# Patient Record
Sex: MALE | Race: WHITE | Employment: OTHER | URBAN - METROPOLITAN AREA
[De-identification: names, ages, dates, MRNs, and addresses within clinical notes are randomized per-mention and may not be internally consistent; named-entity substitution may affect disease eponyms.]

---

## 2018-03-19 ENCOUNTER — OFFICE VISIT (OUTPATIENT)
Dept: CARDIOLOGY CLINIC | Age: 78
End: 2018-03-19
Payer: MEDICARE

## 2018-03-19 VITALS
DIASTOLIC BLOOD PRESSURE: 80 MMHG | HEART RATE: 62 BPM | HEIGHT: 70 IN | BODY MASS INDEX: 30.74 KG/M2 | RESPIRATION RATE: 16 BRPM | WEIGHT: 214.7 LBS | SYSTOLIC BLOOD PRESSURE: 120 MMHG

## 2018-03-19 DIAGNOSIS — I48.21 PERMANENT ATRIAL FIBRILLATION (HCC): ICD-10-CM

## 2018-03-19 DIAGNOSIS — I25.10 CORONARY ARTERY DISEASE INVOLVING NATIVE CORONARY ARTERY OF NATIVE HEART WITHOUT ANGINA PECTORIS: Primary | Chronic | ICD-10-CM

## 2018-03-19 PROCEDURE — 99213 OFFICE O/P EST LOW 20 MIN: CPT | Performed by: INTERNAL MEDICINE

## 2018-03-19 PROCEDURE — 93000 ELECTROCARDIOGRAM COMPLETE: CPT | Performed by: INTERNAL MEDICINE

## 2018-03-19 NOTE — PROGRESS NOTES
 7034. 13. Echo, 2008. Normal LV size and overall systolic function. LA dilation present. Normal valves. LA diameter 4.7 cm. 14. Exercise MPS, 2008. EF 56%. Abnormal septal motion consistent with postop state. Mild inferior hypokinesis. No ischemia or scarring. No TID. Low risk exam.  15. Family history negative for early coronary disease. His father  of cancer and his mother had esophageal cancer. 16. Abdominal aortic US, 2010. Atherosclerotic change with mild dilation of the distal abdominal aorta with maximum diameter 3 cm, unchanged from 2008, followed yearly by Neli Starr.    17. Lower extremity arterial duplex study revealed thrombosed aneurysm, left popliteal fossa with normal popliteal artery flow. Normal JACINDA and normal toe pressures consistent with no significant peripheral ischemia and good peripheral flow.    18. Exercise MPS, 2010. 10.2 METS, >85% MPHR. EF 71%. Septal wall motion abnormality consistent with CABG. Lateral reversible ischemia 11% LV mass. Moderate risk.    19. Intolerant of beta blockers due to lightheadedness, bradycardia and fatigue. Cardizem intolerant due to bradycardia  2011.  20. [facility] admission for AF/RVR, 2011. Echo showed LAE, but otherwise unremarkable. Exercise MPS to 10 METS, 85% MPHR showed normal perfusion with LVEF 52%. Pradaxa started.    21. [id number] estimate yearly stroke risk is 4%. 22. Two pack per day smoker for 20 years, abstinent since .    23. Successful DCCV at 100 watt seconds, [facility], 10/19/2011.    24. OP evaluation, 03/15/2012. AF, ventricular response 76 per minute. Amiodarone therapy initiated as an OP, initially 200 mg bid followed by 200 mg qd.  25. PUD, without symptoms, but with heme positive stools noted 10/2014. NSAID's including aspirin stopped. 26. GI work up  showed a small peptic ulcer and several small polyps, all of which were treated appropriately.    27. Recurrent atrial

## 2018-07-25 ENCOUNTER — TELEPHONE (OUTPATIENT)
Dept: CARDIOLOGY CLINIC | Age: 78
End: 2018-07-25

## 2018-07-26 NOTE — TELEPHONE ENCOUNTER
He should withhold the Xarelto the day before and the day of the procedure and should resume it the day after the procedure. In all, he will only skip 2 pills. Thanks!   DAB

## 2018-09-25 ENCOUNTER — OFFICE VISIT (OUTPATIENT)
Dept: CARDIOLOGY CLINIC | Age: 78
End: 2018-09-25
Payer: MEDICARE

## 2018-09-25 VITALS
HEIGHT: 70 IN | RESPIRATION RATE: 18 BRPM | DIASTOLIC BLOOD PRESSURE: 68 MMHG | HEART RATE: 82 BPM | SYSTOLIC BLOOD PRESSURE: 120 MMHG | BODY MASS INDEX: 29.63 KG/M2 | WEIGHT: 207 LBS

## 2018-09-25 DIAGNOSIS — I25.10 CORONARY ARTERY DISEASE INVOLVING NATIVE CORONARY ARTERY OF NATIVE HEART WITHOUT ANGINA PECTORIS: Primary | Chronic | ICD-10-CM

## 2018-09-25 DIAGNOSIS — I48.21 PERMANENT ATRIAL FIBRILLATION (HCC): ICD-10-CM

## 2018-09-25 PROCEDURE — 99213 OFFICE O/P EST LOW 20 MIN: CPT | Performed by: INTERNAL MEDICINE

## 2018-09-25 PROCEDURE — 93000 ELECTROCARDIOGRAM COMPLETE: CPT | Performed by: INTERNAL MEDICINE

## 2018-09-25 RX ORDER — ROSUVASTATIN CALCIUM 5 MG/1
TABLET, COATED ORAL
COMMUNITY
Start: 2018-07-10 | End: 2021-04-12 | Stop reason: SDUPTHER

## 2018-09-25 RX ORDER — LOSARTAN POTASSIUM 25 MG/1
12.5 TABLET ORAL PRN
COMMUNITY
Start: 2018-08-20 | End: 2021-01-19

## 2018-09-25 NOTE — PROGRESS NOTES
to atrial fibrillation in 2 weeks.    28. Second opinion by Electrophysiology at [facility], 12/20/2018. Recommended no further attempts at rhythm control and also recommended that amiodarone be stopped.       Review of Systems:  HEENT: negative for acute visual and auditory problems  Constitutional: negative for fever and chills  Respiratory: negative for cough and hemoptysis  Cardiovascular: negative for chest pain and dyspnea.  Patient has rare episodes of lightheadedness with change in posture.    Gastrointestinal: negative for abdominal pain, diarrhea, nausea and vomiting. Genitourinary: negative for dysuria and hematuria  Derm: negative for rash and skin lesion(s)  Neurological: negative for seizures and tremors  Endocrine: negative for diabetic symptoms including polydipsia and polyuria  Musculoskeletal: chronic peripheral edema, especially on the left  Psychiatric: negative for anxiety and major depression.     The remainder of the review of systems is negative except as noted above. On exam, he is a well-nourished white male who is awake, alert and oriented. P: 82 and irregularly irregular. BP: 120/68. Wt. 207 lbs. BMI: 29.7. HEENT is normocephalic and atraumatic. Extraocular muscles are intact. Sclerae are clear. Pupils are equal, round and react to light. The oral mucosa is moist.  Tongue is midline. His neck is supple. He has no jugular distention. Carotids are full. There are no bruits. He has no neck or supraclavicular masses and no thyromegaly. Respirations are unlabored. His chest is clear to auscultation and percussion. He has no presacral edema or chest wall tenderness. His heart has a regularly irregular rhythm without murmurs or gallops. PMI is not displaced. There is no precordial heave, lift or thrill. His abdomen is soft and normally active without masses, organomegaly or bruits. Extremities showed no edema.   Peripheral pulses are easily palpated in the feet bilaterally. I reviewed his electrocardiogram, which showed atrial fibrillation with a controlled ventricular response. He has an incomplete right bundle branch block. The tracing was unchanged from 03/19/2018. The patient has noted that his blood pressure has been fairly low and therefore has decreased his intake of Valsartan. He takes 1/2 tablet on occasion and often does not take it at all. He continues to check his blood pressure once or twice a day and has good measurements. Since he is not diabetic nor in failure, I believe that his approach to his blood pressure is reasonable. He states that since he has decreased his consumption of Valsartan, the lightheadedness has resolved. At the end of the visit, his medications were:   rosuvastatin (CRESTOR) 5 MG tablet    losartan (COZAAR) 25 MG tablet 25 mg    Cholecalciferol (VITAMIN D3) 2000 units CAPS Take 1 capsule by mouth daily   RESTASIS 0.05 % ophthalmic emulsion    aspirin 81 MG chewable tablet Take 81 mg by mouth daily   rivaroxaban (XARELTO) 20 MG TABS tablet Take 20 mg by mouth daily (with breakfast)    LUMIGAN 0.01 % SOLN Place 1 drop into both eyes daily. valsartan (DIOVAN) 160 MG tablet TAKE 1 TABLET DAILY (INSTRUCTED TO TAKE THE MORNING OF PROCEDURE)   pravastatin (PRAVACHOL) 40 MG tablet Take 40 mg by mouth daily. As noted, he is planning a river cruise of [facility] in the next few months. He should continue his current medical regimen and follow up with me in 6 months. I thank Dr. Antonino Colon for asking our advice regarding his care.       DAB/tlr

## 2018-10-10 ENCOUNTER — HOSPITAL ENCOUNTER (OUTPATIENT)
Dept: CARDIOLOGY | Age: 78
Discharge: HOME | End: 2018-10-10
Payer: MEDICARE

## 2018-10-10 VITALS
DIASTOLIC BLOOD PRESSURE: 60 MMHG | HEIGHT: 70 IN | SYSTOLIC BLOOD PRESSURE: 132 MMHG | WEIGHT: 202 LBS | HEART RATE: 86 BPM | BODY MASS INDEX: 28.92 KG/M2

## 2018-10-10 DIAGNOSIS — I25.10 CORONARY ARTERY DISEASE INVOLVING NATIVE CORONARY ARTERY OF NATIVE HEART WITHOUT ANGINA PECTORIS: Chronic | ICD-10-CM

## 2018-10-10 DIAGNOSIS — I48.21 PERMANENT ATRIAL FIBRILLATION (HCC): ICD-10-CM

## 2018-10-10 DIAGNOSIS — I25.10 CORONARY ARTERY DISEASE INVOLVING NATIVE CORONARY ARTERY OF NATIVE HEART WITHOUT ANGINA PECTORIS: Primary | Chronic | ICD-10-CM

## 2018-10-10 DIAGNOSIS — R06.02 SHORTNESS OF BREATH: Primary | ICD-10-CM

## 2018-10-10 PROCEDURE — 78452 HT MUSCLE IMAGE SPECT MULT: CPT

## 2018-10-10 PROCEDURE — 3430000000 HC RX DIAGNOSTIC RADIOPHARMACEUTICAL: Performed by: INTERNAL MEDICINE

## 2018-10-10 PROCEDURE — A9500 TC99M SESTAMIBI: HCPCS | Performed by: INTERNAL MEDICINE

## 2018-10-10 PROCEDURE — 93017 CV STRESS TEST TRACING ONLY: CPT

## 2018-10-10 PROCEDURE — 2580000003 HC RX 258: Performed by: INTERNAL MEDICINE

## 2018-10-10 RX ORDER — CELECOXIB 100 MG/1
100 CAPSULE ORAL PRN
COMMUNITY

## 2018-10-10 RX ORDER — SODIUM CHLORIDE 0.9 % (FLUSH) 0.9 %
10 SYRINGE (ML) INJECTION PRN
Status: DISCONTINUED | OUTPATIENT
Start: 2018-10-10 | End: 2018-10-11 | Stop reason: HOSPADM

## 2018-10-10 RX ADMIN — Medication 9.6 MILLICURIE: at 09:13

## 2018-10-10 RX ADMIN — Medication 30.2 MILLICURIE: at 10:51

## 2018-10-10 RX ADMIN — Medication 10 ML: at 09:13

## 2018-10-10 RX ADMIN — Medication 10 ML: at 10:51

## 2018-10-10 NOTE — PROCEDURES
Saline. Gated post-stress tomographic imaging was performed 20-25 minutes after stress. FINDINGS: The overall quality of the study was fair. Left ventricular cavity size was noted to be normal.    Rotational analog analysis demonstrated no patient motion or abnormal extracardiac radioactivity. The gated SPECT stress imaging in the short, vertical long, and horizontal long axis demonstrated normal homogeneous tracer distribution throughout the myocardium. The resting images show no change. Gated SPECT left ventricular ejection fraction was calculated to be 43%, with hypokinesis of the global wall. Impression:    1. Exercise EKG was negative. 2. The patient experienced no chest pain with exercise. 3. The myocardial perfusion imaging was normal with attenuation artifact   4. Overall left ventricular systolic function was abnormal without regional wall motion abnormalities. 5. Parham treadmill score was 7 implying low risk. 6. Exercise capacity was above average. 7. Intermediate risk general exercise treadmill test. Due to LV dysfunction. Thank you for sending your patient to this [employer].      Electronically signed by George Solano MD on 10/10/18 at 3:11 PM

## 2018-10-11 ENCOUNTER — TELEPHONE (OUTPATIENT)
Dept: CARDIOLOGY CLINIC | Age: 78
End: 2018-10-11

## 2018-10-22 ENCOUNTER — HOSPITAL ENCOUNTER (OUTPATIENT)
Dept: CARDIOLOGY | Age: 78
Discharge: HOME | End: 2018-10-22
Payer: MEDICARE

## 2018-10-22 DIAGNOSIS — I25.10 CORONARY ARTERY DISEASE INVOLVING NATIVE CORONARY ARTERY OF NATIVE HEART WITHOUT ANGINA PECTORIS: Chronic | ICD-10-CM

## 2018-10-22 DIAGNOSIS — I48.21 PERMANENT ATRIAL FIBRILLATION (HCC): ICD-10-CM

## 2018-10-22 LAB
LV EF: 47 %
LVEF MODALITY: NORMAL

## 2018-10-22 PROCEDURE — 93306 TTE W/DOPPLER COMPLETE: CPT

## 2018-10-23 ENCOUNTER — TELEPHONE (OUTPATIENT)
Dept: CARDIOLOGY CLINIC | Age: 78
End: 2018-10-23

## 2019-03-21 DIAGNOSIS — I73.9 PVD (PERIPHERAL VASCULAR DISEASE) (HCC): ICD-10-CM

## 2019-03-21 DIAGNOSIS — I72.4 POPLITEAL ANEURYSM (HCC): ICD-10-CM

## 2019-03-21 DIAGNOSIS — I71.40 ABDOMINAL AORTIC ANEURYSM (AAA) WITHOUT RUPTURE: Primary | ICD-10-CM

## 2019-03-28 ENCOUNTER — OFFICE VISIT (OUTPATIENT)
Dept: VASCULAR SURGERY | Age: 79
End: 2019-03-28
Payer: MEDICARE

## 2019-03-28 ENCOUNTER — HOSPITAL ENCOUNTER (OUTPATIENT)
Dept: CARDIOLOGY | Age: 79
Discharge: HOME | End: 2019-03-28
Payer: MEDICARE

## 2019-03-28 DIAGNOSIS — I72.4 POPLITEAL ARTERY ANEURYSM (HCC): ICD-10-CM

## 2019-03-28 DIAGNOSIS — I72.4 POPLITEAL ANEURYSM (HCC): ICD-10-CM

## 2019-03-28 DIAGNOSIS — I73.9 PVD (PERIPHERAL VASCULAR DISEASE) WITH CLAUDICATION (HCC): ICD-10-CM

## 2019-03-28 DIAGNOSIS — I71.40 ABDOMINAL AORTIC ANEURYSM (AAA) WITHOUT RUPTURE: Chronic | ICD-10-CM

## 2019-03-28 DIAGNOSIS — I73.9 PVD (PERIPHERAL VASCULAR DISEASE) (HCC): ICD-10-CM

## 2019-03-28 DIAGNOSIS — Z95.828 S/P FEMORAL-POPLITEAL BYPASS SURGERY: Primary | ICD-10-CM

## 2019-03-28 DIAGNOSIS — I71.40 ABDOMINAL AORTIC ANEURYSM (AAA) WITHOUT RUPTURE: ICD-10-CM

## 2019-03-28 PROCEDURE — 93926 LOWER EXTREMITY STUDY: CPT

## 2019-03-28 PROCEDURE — 99214 OFFICE O/P EST MOD 30 MIN: CPT | Performed by: SURGERY

## 2019-03-28 PROCEDURE — 93978 VASCULAR STUDY: CPT

## 2019-03-28 PROCEDURE — 93923 UPR/LXTR ART STDY 3+ LVLS: CPT

## 2019-04-10 ENCOUNTER — OFFICE VISIT (OUTPATIENT)
Dept: CARDIOLOGY CLINIC | Age: 79
End: 2019-04-10
Payer: MEDICARE

## 2019-04-10 VITALS
DIASTOLIC BLOOD PRESSURE: 82 MMHG | BODY MASS INDEX: 29.81 KG/M2 | RESPIRATION RATE: 16 BRPM | HEART RATE: 66 BPM | WEIGHT: 208.2 LBS | SYSTOLIC BLOOD PRESSURE: 138 MMHG | HEIGHT: 70 IN

## 2019-04-10 DIAGNOSIS — Z79.01 CHRONIC ANTICOAGULATION: ICD-10-CM

## 2019-04-10 DIAGNOSIS — I10 ESSENTIAL HYPERTENSION: ICD-10-CM

## 2019-04-10 DIAGNOSIS — I25.10 CORONARY ARTERY DISEASE INVOLVING NATIVE CORONARY ARTERY OF NATIVE HEART WITHOUT ANGINA PECTORIS: Primary | Chronic | ICD-10-CM

## 2019-04-10 DIAGNOSIS — I48.21 PERMANENT ATRIAL FIBRILLATION (HCC): ICD-10-CM

## 2019-04-10 DIAGNOSIS — Z95.1 HX OF CABG: ICD-10-CM

## 2019-04-10 PROCEDURE — 93000 ELECTROCARDIOGRAM COMPLETE: CPT | Performed by: INTERNAL MEDICINE

## 2019-04-10 PROCEDURE — 99214 OFFICE O/P EST MOD 30 MIN: CPT | Performed by: INTERNAL MEDICINE

## 2019-04-10 NOTE — PROGRESS NOTES
OUTPATIENT CARDIOLOGY FOLLOW-UP    Name: Susan Reyna    Age: 77 y.o. Primary Care Physician: Hernan Houston MD    Date of Service: 4/10/2019    Chief Complaint: Permanent atrial fibrillation, CAD    Interim History:  Previously followed by Dr. Jan Menezes. No new cardiac complaints since last cardiology evaluation (\"feel good\"). He denies recent chest pain, worsening SOB, palpitations, syncope, PND, or orthopnea. Rate controlled atrial fibrillation on EKG. He walks at the mall on a regular basis (4-5 laps). He is friends with Lakeshia Frazier and Robert Orta.     Review of Systems:   Cardiac: As per HPI  General: No fever, chills  Pulmonary: As per HPI  HEENT: No visual disturbances, difficult swallowing  GI: No nausea, vomiting  Musculoskeletal: BREAUX x 4, no focal motor deficits  Skin: Intact, no rashes  Neuro/Psych: No headache or seizures    Past Medical History:  Past Medical History:   Diagnosis Date    Atrial fibrillation ([address]     Atrial fibrillation with RVR ([address] 9/23/2011    Basal cell cancer     resected from left leg    Dyslipidemia     Gastric ulcer approx 2015    Hypertension     Myocardial infarct ([address] 1981    PVD (peripheral vascular disease) with claudication ([address] 1/9/2014    S/P femoral-popliteal bypass surgery 1/9/2014    Shoulder problem     Tachycardia        Past Surgical History:  Past Surgical History:   Procedure Laterality Date    ARTERIAL ANEURYSM REPAIR      popliteal artery - Dr. Max Henriquez, 11/2002    CARDIOVERSION  10/19/2011    DCCV    CORONARY ANGIOPLASTY  7/26/1988, 9/19/1988    CORONARY ARTERY BYPASS GRAFT  11/26/2002    ECHO COMPL W DOP COLOR FLOW  9/24/2011         UPPER GASTROINTESTINAL ENDOSCOPY         Family History:  Family History   Problem Relation Age of Onset    Cancer Mother     Cancer Father        Social History:  Social History     Socioeconomic History    Marital status:      Spouse name: Not on file    Number of children: Not on file    Years of education: Not on file    Highest education level: Not on file   Occupational History    Not on file   Social Needs    Financial resource strain: Not on file    Food insecurity:     Worry: Not on file     Inability: Not on file    Transportation needs:     Medical: Not on file     Non-medical: Not on file   Tobacco Use    Smoking status: Former Smoker     Packs/day: 2.00     Years: 20.00     Pack years: 40.00     Types: Cigarettes     Last attempt to quit: 9/3/1980     Years since quittin.6    Smokeless tobacco: Never Used   Substance and Sexual Activity    Alcohol use: Yes     Alcohol/week: 0.0 oz     Comment: Wine a few glasses a week    Drug use: No    Sexual activity: Not on file   Lifestyle    Physical activity:     Days per week: Not on file     Minutes per session: Not on file    Stress: Not on file   Relationships    Social connections:     Talks on phone: Not on file     Gets together: Not on file     Attends Confucianism service: Not on file     Active member of club or organization: Not on file     Attends meetings of clubs or organizations: Not on file     Relationship status: Not on file    Intimate partner violence:     Fear of current or ex partner: Not on file     Emotionally abused: Not on file     Physically abused: Not on file     Forced sexual activity: Not on file   Other Topics Concern    Not on file   Social History Narrative    Not on file       Allergies:   Allergies   Allergen Reactions    Diltiazem Hcl      Bradycardia         Current Medications:  Current Outpatient Medications   Medication Sig Dispense Refill    rosuvastatin (CRESTOR) 5 MG tablet       losartan (COZAAR) 25 MG tablet 25 mg       Cholecalciferol (VITAMIN D3) 2000 units CAPS Take 1 capsule by mouth daily      RESTASIS 0.05 % ophthalmic emulsion       aspirin 81 MG chewable tablet Take 81 mg by mouth daily      rivaroxaban (XARELTO) 20 MG TABS tablet Take 20 mg by mouth daily (with breakfast)       LUMIGAN 0.01 % SOLN Place 1 drop into both eyes daily.  celecoxib (CELEBREX) 100 MG capsule Take 100 mg by mouth daily      pravastatin (PRAVACHOL) 40 MG tablet Take 40 mg by mouth daily        No current facility-administered medications for this visit. Physical Exam:  /82   Pulse 66   Resp 16   Ht 5' 10\" (1.778 m)   Wt 208 lb 3.2 oz (94.4 kg)   BMI 29.87 kg/m²   Wt Readings from Last 3 Encounters:   04/10/19 208 lb 3.2 oz (94.4 kg)   10/10/18 202 lb (91.6 kg)   09/25/18 207 lb (93.9 kg)     Appearance: Awake, alert and oriented x 3, no acute respiratory distress  Skin: Intact, no rash  Head: Normocephalic, atraumatic  Eyes: EOMI, no conjunctival erythema  ENMT: No pharyngeal erythema, MMM, no rhinorrhea  Neck: Supple, no carotid bruits  Lungs: Clear to auscultation bilaterally. No wheezes, rales, or rhonchi.   Cardiac: IRRR, no murmurs apparent  Abdomen: Soft, nontender, +bowel sounds  Extremities: Moves all extremities x 4, no lower extremity edema  Neurologic: No focal motor deficits apparent, normal mood and affect, alert and oriented x 3    Laboratory Tests:  Lab Results   Component Value Date    CREATININE 1.1 09/22/2015    BUN 24 (H) 09/22/2015     09/22/2015    K 5.2 (H) 09/22/2015     09/22/2015    CO2 24 09/22/2015     No results found for: MG  Lab Results   Component Value Date    WBC 5.3 09/22/2015    HGB 14.9 09/22/2015    HCT 45.7 09/22/2015    .4 (H) 09/22/2015     09/22/2015     Lab Results   Component Value Date    ALT 72 (H) 09/22/2015    AST 42 (H) 09/22/2015    ALKPHOS 54 09/22/2015    BILITOT 0.4 09/22/2015     Lab Results   Component Value Date    INR 1.5 10/11/2011    INR 1.3 09/24/2011    PROTIME 12.9 (H) 10/11/2011    PROTIME 12.8 (H) 09/24/2011     Lab Results   Component Value Date    TSH 3.880 09/22/2015     Lab Results   Component Value Date    LABA1C 5.9 09/22/2015     No results found for: EAG  Lab Results   Component Value Date    CHOL 157 09/22/2015    CHOL 118 09/24/2011     Lab Results   Component Value Date    TRIG 104 09/22/2015    TRIG 47 09/24/2011     Lab Results   Component Value Date    HDL 60 09/22/2015    HDL 54.0 09/24/2011     Lab Results   Component Value Date    LDLCALC 76 09/22/2015    LDLCALC 55 09/24/2011     Lab Results   Component Value Date    LABVLDL 21 09/22/2015     No results found for: CHOLHDLRATIO  No results for input(s): PROBNP in the last 72 hours. Cardiac Tests:  ECG: atrial fibrillation, rate 66, NSSTT changes    Echocardiogram: 10/22/18 (Dr. Kaylene Macdonald)   Left ventricular internal dimensions are normal.   Left ventricular wall thickness is moderately hypertrophied.   The left ventricle basal inferior wall appears akinetic.   Overall left ventricular systolic function is mildly impaired.   The ejection fraction is estimated to be 47% by the biplane method of disks.   Diastole could not be fully assessed.   The left atrium is severely enlarged as determined by the left atrial volume index.   The right atrium appears to be enlarged.   Normal mitral leaflet structure and coaptation with no more than mild, centrally directed, mitral insufficiency.   The aortic valve appears mildly sclerotic.   Mild aortic regurgitation is noted. Exercise nuclear stress test: 10/10/18  1. Exercise EKG was negative. 2. The patient experienced no chest pain with exercise. 3. The myocardial perfusion imaging was normal with attenuation artifact   4. Overall left ventricular systolic function was abnormal without regional wall motion abnormalities. 5. Parham treadmill score was 7 implying low risk.    6. Exercise capacity was above average.    7. Intermediate risk general exercise treadmill test. Due to LV dysfunction. 8. Gated SPECT left ventricular ejection fraction was calculated to be 43%, with hypokinesis of the global wall.     ASSESSMENT / PLAN:  1.  Acute inferior wall MI, 10/1981. Medical therapy.    2. Cath with CX angioplasty, 1988. 3. Cath with CX angioplasty, 1988.  4. Worsening chest pain prompting catheterization, 2002.    5. CABG, 2002, [facility], Dr. Praveen Samaniego. LIMA-LAD, free pedicle ISAIAH-diagonal, left radial graft-PDA and SVG-OM branch of CX.  6. 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 Azalee Canavan.    7. Abdominal aortic US, 2008. Abdominal aortic maximal diameter of 3 cm. No definite aneurysm seen.    8. Dyslipidemia. Total cholesterol 141, HDL 41, LDL 76 - .  9. Echo, 2008. Normal LV size and overall systolic function. LA dilation present. Normal valves. LA diameter 4.7 cm. 10. Exercise MPS, 2008. EF 56%. Abnormal septal motion consistent with postop state. Mild inferior hypokinesis. No ischemia or scarring. No TID. Low risk exam.  11. Family history negative for early coronary disease. His father  of cancer and his mother had esophageal cancer. 12. Abdominal aortic US, 2010. Atherosclerotic change with mild dilation of the distal abdominal aorta with maximum diameter 3 cm, unchanged from 2008, followed yearly by Elis Borjas.    13. Lower extremity arterial duplex study revealed thrombosed aneurysm, left popliteal fossa with normal popliteal artery flow. Normal JACINDA and normal toe pressures consistent with no significant peripheral ischemia and good peripheral flow.    14. Exercise MPS, 2010. 10.2 METS, >85% MPHR. EF 71%. Septal wall motion abnormality consistent with CABG. Lateral reversible ischemia 11% LV mass. Moderate risk.    15. Intolerant of beta blockers due to lightheadedness, bradycardia and fatigue. Cardizem intolerant due to bradycardia - 2011. 16. [facility] admission for AF/RVR, 2011. Echo showed LAE, but otherwise unremarkable. Exercise MPS to 10 METS, 85% MPHR showed normal perfusion with LVEF 52%. Pradaxa started.    17. Elevated [id number] score  18.  Two pack per day smoker for 20 years, abstinent since 1980.    19. Successful DCCV at 100 watt seconds, [facility] [address], 10/19/2011.    20. OP evaluation, 03/15/2012. AF, ventricular response 76 per minute. Amiodarone therapy initiated as an OP, initially 200 mg bid followed by 200 mg qd.  21. PUD, without symptoms, but with heme positive stools noted 10/2014. NSAID's including aspirin stopped. 22. GI work up 2015 showed a small peptic ulcer and several small polyps, all of which were treated appropriately. 23. Recurrent atrial fibrillation documented mid October, 2017.  Patient underwent elective cardioversion, 11/03/2017 (Dr. Selena Myles), but he reverted back to atrial fibrillation in 2 weeks.    24. Second opinion by Electrophysiology at [facility], 12/20/2018. Recommended no further attempts at rhythm control and also recommended that amiodarone be stopped.      - Prior cardiology records reviewed today  - Continue home BP monitoring (BP log reviewed today -- BP intermittently elevated on days he doesn't take ARB)  - Continue current medications (including ARB and xarelto)  - Results of 10/2018 stress test and echocardiogram reviewed with the patient today    The patient's current medication list, allergies, problem list and results of all previously ordered testing were reviewed at today's visit.     Selina Mckee MD  [facility]) Cardiology

## 2019-11-18 LAB
BASOPHILS ABSOLUTE: NORMAL
BASOPHILS RELATIVE PERCENT: NORMAL
CHOLESTEROL, TOTAL: NORMAL
CHOLESTEROL/HDL RATIO: NORMAL
CREATININE: NORMAL
EOSINOPHILS ABSOLUTE: NORMAL
EOSINOPHILS RELATIVE PERCENT: NORMAL
HCT VFR BLD CALC: NORMAL %
HDLC SERPL-MCNC: NORMAL MG/DL
HEMOGLOBIN: NORMAL
LDL CHOLESTEROL CALCULATED: NORMAL
LYMPHOCYTES ABSOLUTE: NORMAL
LYMPHOCYTES RELATIVE PERCENT: NORMAL
MCH RBC QN AUTO: NORMAL PG
MCHC RBC AUTO-ENTMCNC: NORMAL G/DL
MCV RBC AUTO: NORMAL FL
MONOCYTES ABSOLUTE: NORMAL
MONOCYTES RELATIVE PERCENT: NORMAL
NEUTROPHILS ABSOLUTE: NORMAL
NEUTROPHILS RELATIVE PERCENT: NORMAL
PLATELET # BLD: NORMAL 10*3/UL
PMV BLD AUTO: NORMAL FL
POTASSIUM (K+): NORMAL
PROSTATE SPECIFIC ANTIGEN: NORMAL
RBC # BLD: NORMAL 10*6/UL
TRIGL SERPL-MCNC: NORMAL MG/DL
VITAMIN D 25-HYDROXY: NORMAL
VITAMIN D2, 25 HYDROXY: NORMAL
VITAMIN D3,25 HYDROXY: NORMAL
VLDLC SERPL CALC-MCNC: NORMAL MG/DL
WBC # BLD: NORMAL 10*3/UL

## 2019-11-19 LAB
AVERAGE GLUCOSE: 123
HBA1C MFR BLD: 5.9 %

## 2020-03-09 ENCOUNTER — TELEPHONE (OUTPATIENT)
Dept: CARDIOLOGY CLINIC | Age: 80
End: 2020-03-09

## 2020-03-09 NOTE — TELEPHONE ENCOUNTER
Patient scheduled for colonoscopy with Dr. Nalini Mitchell. Recommended to hold Xarelto (2) days prior. Please advise.

## 2020-05-05 ENCOUNTER — HOSPITAL ENCOUNTER (OUTPATIENT)
Age: 80
Discharge: HOME | End: 2020-05-07

## 2020-05-05 PROCEDURE — 88305 TISSUE EXAM BY PATHOLOGIST: CPT

## 2020-06-10 ENCOUNTER — OFFICE VISIT (OUTPATIENT)
Dept: CARDIOLOGY CLINIC | Age: 80
End: 2020-06-10
Payer: MEDICARE

## 2020-06-10 VITALS
WEIGHT: 197.9 LBS | TEMPERATURE: 97.9 F | SYSTOLIC BLOOD PRESSURE: 130 MMHG | HEIGHT: 70 IN | DIASTOLIC BLOOD PRESSURE: 80 MMHG | BODY MASS INDEX: 28.33 KG/M2 | HEART RATE: 75 BPM | RESPIRATION RATE: 18 BRPM

## 2020-06-10 PROCEDURE — 93000 ELECTROCARDIOGRAM COMPLETE: CPT | Performed by: INTERNAL MEDICINE

## 2020-06-10 PROCEDURE — 99213 OFFICE O/P EST LOW 20 MIN: CPT | Performed by: INTERNAL MEDICINE

## 2020-06-10 NOTE — PROGRESS NOTES
(H) 10/11/2011    PROTIME 12.8 (H) 09/24/2011     Lab Results   Component Value Date    TSH 3.880 09/22/2015     Lab Results   Component Value Date    LABA1C 5.9 09/22/2015     No results found for: EAG  Lab Results   Component Value Date    CHOL 157 09/22/2015    CHOL 118 09/24/2011     Lab Results   Component Value Date    TRIG 104 09/22/2015    TRIG 47 09/24/2011     Lab Results   Component Value Date    HDL 60 09/22/2015    HDL 54.0 09/24/2011     Lab Results   Component Value Date    LDLCALC 76 09/22/2015    LDLCALC 55 09/24/2011     Lab Results   Component Value Date    LABVLDL 21 09/22/2015     No results found for: CHOLHDLRATIO  No results for input(s): PROBNP in the last 72 hours. Cardiac Tests:  ECG: atrial fibrillation, rate 75, NSSTT changes    Echocardiogram: 10/22/18 (Dr. Nicolas Ruth)   Left ventricular internal dimensions are normal.   Left ventricular wall thickness is moderately hypertrophied.   The left ventricle basal inferior wall appears akinetic.   Overall left ventricular systolic function is mildly impaired.   The ejection fraction is estimated to be 47% by the biplane method of disks.   Diastole could not be fully assessed.   The left atrium is severely enlarged as determined by the left atrial volume index.   The right atrium appears to be enlarged.   Normal mitral leaflet structure and coaptation with no more than mild, centrally directed, mitral insufficiency.   The aortic valve appears mildly sclerotic.   Mild aortic regurgitation is noted. Exercise nuclear stress test: 10/10/18  1. Exercise EKG was negative. 2. The patient experienced no chest pain with exercise. 3. The myocardial perfusion imaging was normal with attenuation artifact   4. Overall left ventricular systolic function was abnormal without regional wall motion abnormalities. 5. Parham treadmill score was 7 implying low risk.    6. Exercise capacity was above average.    7.  Intermediate risk general exercise treadmill test. Due to LV dysfunction. 8. Gated SPECT left ventricular ejection fraction was calculated to be 43%, with hypokinesis of the global wall. ASSESSMENT / PLAN:  1.  Acute inferior wall MI, 10/1981. Medical therapy.    2. Cath with CX angioplasty, 1988. 3. Cath with CX angioplasty, 1988.  4. Worsening chest pain prompting catheterization, 2002.    5. CABG, 2002, [facility], Dr. Dione Francisco. LIMA-LAD, free pedicle ISAIAH-diagonal, left radial graft-PDA and SVG-OM branch of CX.  6. 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 Jed Tilley.    7. Abdominal aortic US, 2008. Abdominal aortic maximal diameter of 3 cm. No definite aneurysm seen.    8. Dyslipidemia. Total cholesterol 141, HDL 41, LDL 76 - .  9. Echo, 2008. Normal LV size and overall systolic function. LA dilation present. Normal valves. LA diameter 4.7 cm. 10. Exercise MPS, 2008. EF 56%. Abnormal septal motion consistent with postop state. Mild inferior hypokinesis. No ischemia or scarring. No TID. Low risk exam.  11. Family history negative for early coronary disease. His father  of cancer and his mother had esophageal cancer. 12. Abdominal aortic US, 2010. Atherosclerotic change with mild dilation of the distal abdominal aorta with maximum diameter 3 cm, unchanged from 2008, followed yearly by Debora Davila.    13. Lower extremity arterial duplex study revealed thrombosed aneurysm, left popliteal fossa with normal popliteal artery flow. Normal JACINDA and normal toe pressures consistent with no significant peripheral ischemia and good peripheral flow.    14. Exercise MPS, 2010. 10.2 METS, >85% MPHR. EF 71%. Septal wall motion abnormality consistent with CABG. Lateral reversible ischemia 11% LV mass. Moderate risk.    15. Intolerant of beta blockers due to lightheadedness, bradycardia and fatigue. Cardizem intolerant due to bradycardia - 2011. 16.  [facility] admission for AF/RVR,

## 2020-06-23 VITALS
SYSTOLIC BLOOD PRESSURE: 108 MMHG | HEART RATE: 66 BPM | TEMPERATURE: 96.8 F | HEIGHT: 71 IN | RESPIRATION RATE: 17 BRPM | WEIGHT: 194 LBS | BODY MASS INDEX: 27.16 KG/M2 | DIASTOLIC BLOOD PRESSURE: 67 MMHG

## 2020-06-25 ENCOUNTER — HOSPITAL ENCOUNTER (OUTPATIENT)
Dept: CARDIOLOGY | Age: 80
Discharge: HOME | End: 2020-06-25
Payer: MEDICARE

## 2020-06-25 ENCOUNTER — OFFICE VISIT (OUTPATIENT)
Dept: VASCULAR SURGERY | Age: 80
End: 2020-06-25
Payer: MEDICARE

## 2020-06-25 VITALS — BODY MASS INDEX: 28.35 KG/M2 | RESPIRATION RATE: 16 BRPM | HEIGHT: 70 IN | WEIGHT: 198 LBS

## 2020-06-25 PROCEDURE — 93978 VASCULAR STUDY: CPT

## 2020-06-25 PROCEDURE — 93923 UPR/LXTR ART STDY 3+ LVLS: CPT

## 2020-06-25 PROCEDURE — 93926 LOWER EXTREMITY STUDY: CPT

## 2020-06-25 PROCEDURE — 99214 OFFICE O/P EST MOD 30 MIN: CPT | Performed by: SURGERY

## 2020-06-25 NOTE — PROGRESS NOTES
following. The ultrasound abdominal echo revealed stable aneurysm, 3.2 cm with slight dilatation right iliac artery, without any significant change from last year, patient is reassured and will instead call and come to the hospital any abdominal pain or back pain. The popliteal artery duplex scan revealed on the right side, dilatation artery, 1.8 cm, without any signal change from last year, patient again was instructed call immediately if any symptoms in 1 pain etc. of the right leg,    The lower extremity arterial Doppler study was done was personally reviewed by me, normal ankle minutes with triphasic ankle Doppler tracings with patent bypass graft. Patient is reassured              Patient was instructed to continue walking program and to call if any worsening of symptoms and to call if any focal lateralizing neurological symptoms like loss of speech, vision or loss of function of extremity. All the questions were answered. Indicated follow-up: Return in about 1 year (around 6/25/2021), or if symptoms worsen or fail to improve.

## 2020-06-29 NOTE — PROCEDURES
[address]                  [address] [facility],  [facility]                                VASCULAR REPORT    PATIENT NAME: Cheryl Martinez                         :        1940  MED REC NO:   [id number]                            ROOM:  ACCOUNT NO:   [de-identified]                           ADMIT DATE: 2020  PROVIDER:     Aquiles Saldaña MD    DATE OF PROCEDURE:  2020    LOWER EXTREMITY ARTERIAL DOPPLER STUDY    INDICATION:  Difficulty walking, post left femoropopliteal bypass  surgery. FINDINGS:  The lower extremity arterial Doppler study revealed that the  patient has a normal ankle-brachial index with triphasic ankle Doppler  tracings and good arterial flow to both feet based upon the pulse volume  recordings of the metatarsals.         Jennifer Caicedo MD    D: 2020 6:24:31       T: 2020 9:01:42     [email or web address]  Job#: [id number]     Doc#: [id number]

## 2020-06-29 NOTE — PROCEDURES
[address]                  [address] [facility],  [facility]                                VASCULAR REPORT    PATIENT NAME: Robson Correa                         :        1940  MED REC NO:   [id number]                            ROOM:  ACCOUNT NO:   [de-identified]                           ADMIT DATE: 2020  PROVIDER:     Maryan Ramos MD    DATE OF PROCEDURE:  2020    ULTRASOUND OF THE ABDOMINAL AORTA    INDICATION:  History of abdominal aortic aneurysm. FINDINGS:  Duplex scanning of the abdominal aorta revealed dilatation of  the aorta, 3.2 cm x 3.2 cm; with right iliac artery dilatation, 2.2 x  2.2 cm; and left iliac artery dilatation, 1.9 x 1.9 cm. IMPRESSION:  Abdominal aortic aneurysm with a maximum diameter of 3.2 cm  and small right iliac artery is about 2.3 cm, unchanged from last year.         Bonny Powers MD    D: 2020 6:23:21       T: 2020 8:55:30     [email or web address]  Job#: [id number]     Doc#: [id number]

## 2020-06-30 ENCOUNTER — HOSPITAL ENCOUNTER (OUTPATIENT)
Age: 80
Discharge: HOME | End: 2020-07-02
Payer: MEDICARE

## 2020-06-30 ENCOUNTER — OFFICE VISIT (OUTPATIENT)
Dept: PRIMARY CARE CLINIC | Age: 80
End: 2020-06-30
Payer: MEDICARE

## 2020-06-30 VITALS
RESPIRATION RATE: 18 BRPM | HEIGHT: 71 IN | OXYGEN SATURATION: 99 % | DIASTOLIC BLOOD PRESSURE: 78 MMHG | SYSTOLIC BLOOD PRESSURE: 120 MMHG | TEMPERATURE: 97.3 F | WEIGHT: 200 LBS | HEART RATE: 76 BPM | BODY MASS INDEX: 28 KG/M2

## 2020-06-30 PROBLEM — I10 ESSENTIAL HYPERTENSION: Status: ACTIVE | Noted: 2020-06-30

## 2020-06-30 LAB
ALBUMIN SERPL-MCNC: 4.4 G/DL (ref 3.5–5.2)
ALP BLD-CCNC: 60 U/L (ref 40–129)
ALT SERPL-CCNC: 32 U/L (ref 0–40)
ANION GAP SERPL CALCULATED.3IONS-SCNC: 12 MMOL/L (ref 7–16)
AST SERPL-CCNC: 25 U/L (ref 0–39)
BILIRUB SERPL-MCNC: 0.8 MG/DL (ref 0–1.2)
BUN BLDV-MCNC: 22 MG/DL (ref 8–23)
CALCIUM SERPL-MCNC: 9.5 MG/DL (ref 8.6–10.2)
CHLORIDE BLD-SCNC: 106 MMOL/L (ref 98–107)
CHOLESTEROL, TOTAL: 129 MG/DL (ref 0–199)
CO2: 23 MMOL/L (ref 22–29)
CREAT SERPL-MCNC: 0.9 MG/DL (ref 0.7–1.2)
GFR AFRICAN AMERICAN: >60
GFR NON-AFRICAN AMERICAN: >60 ML/MIN/1.73
GLUCOSE BLD-MCNC: 117 MG/DL (ref 74–99)
HDLC SERPL-MCNC: 47 MG/DL
LDL CHOLESTEROL CALCULATED: 68 MG/DL (ref 0–99)
POTASSIUM SERPL-SCNC: 4.1 MMOL/L (ref 3.5–5)
SODIUM BLD-SCNC: 141 MMOL/L (ref 132–146)
TOTAL PROTEIN: 7.2 G/DL (ref 6.4–8.3)
TRIGL SERPL-MCNC: 71 MG/DL (ref 0–149)
TSH SERPL DL<=0.05 MIU/L-ACNC: 2.6 UIU/ML (ref 0.27–4.2)
VLDLC SERPL CALC-MCNC: 14 MG/DL

## 2020-06-30 PROCEDURE — 36415 COLL VENOUS BLD VENIPUNCTURE: CPT

## 2020-06-30 PROCEDURE — 84403 ASSAY OF TOTAL TESTOSTERONE: CPT

## 2020-06-30 PROCEDURE — 84443 ASSAY THYROID STIM HORMONE: CPT

## 2020-06-30 PROCEDURE — 84270 ASSAY OF SEX HORMONE GLOBUL: CPT

## 2020-06-30 PROCEDURE — 80061 LIPID PANEL: CPT

## 2020-06-30 PROCEDURE — 80053 COMPREHEN METABOLIC PANEL: CPT

## 2020-06-30 PROCEDURE — 99214 OFFICE O/P EST MOD 30 MIN: CPT | Performed by: INTERNAL MEDICINE

## 2020-06-30 ASSESSMENT — PATIENT HEALTH QUESTIONNAIRE - PHQ9
2. FEELING DOWN, DEPRESSED OR HOPELESS: 0
1. LITTLE INTEREST OR PLEASURE IN DOING THINGS: 0
SUM OF ALL RESPONSES TO PHQ QUESTIONS 1-9: 0
SUM OF ALL RESPONSES TO PHQ9 QUESTIONS 1 & 2: 0
SUM OF ALL RESPONSES TO PHQ QUESTIONS 1-9: 0

## 2020-06-30 NOTE — PROGRESS NOTES
Linh Lopez  6/30/20     Chief Complaint   Patient presents with    Hypertension     6 month check up        Allergies   Allergen Reactions    Diltiazem Hcl      Bradycardia          Current Outpatient Medications   Medication Sig Dispense Refill    celecoxib (CELEBREX) 100 MG capsule Take 100 mg by mouth as needed       rosuvastatin (CRESTOR) 5 MG tablet       losartan (COZAAR) 25 MG tablet Take 12.5 mg by mouth as needed Indications: pt states he takes periodically pt took 50mg on 10/3/18       Cholecalciferol (VITAMIN D3) 2000 units CAPS Take 1 capsule by mouth daily      RESTASIS 0.05 % ophthalmic emulsion       aspirin 81 MG chewable tablet Take 81 mg by mouth daily      rivaroxaban (XARELTO) 20 MG TABS tablet Take 20 mg by mouth daily (with breakfast)       LUMIGAN 0.01 % SOLN Place 1 drop into both eyes daily. No current facility-administered medications for this visit. HPI: Patient comes in for routine follow-up visit. Currently feels well. He remains physically active and walks on a fairly regular basis. He denies any chest pain or shortness of breath. He follows up with his cardiologist for his history of coronary artery disease and permanent atrial fibrillation. He remains on long-term anticoagulation with Xarelto and has not had any bleeding issues. He remains on all of his usual meds and supplements the same as listed on his med list, which was reviewed with him. Review of Systems: as per HPI      Physical Exam:    Patient is a [de-identified] y.o. male. Patient appears to be in no distress. Breathing comfortably. Ambulates without assistance. HEENT: normal.  Neck supple, no adenopathy or bruits. Heart RR, no MGR. Lungs clear. Abd: normal  Ext.: no edema. Peripheral pulses: normal.  No neurologic deficits noted.     Lab Results   Component Value Date    WBC 5.3 09/22/2015    HGB 14.9 09/22/2015    HCT 45.7 09/22/2015     09/22/2015    CHOL 157 09/22/2015    TRIG 104

## 2020-07-02 LAB
SEX HORMONE BINDING GLOBULIN: 63 NMOL/L (ref 11–80)
TESTOSTERONE FREE-NONMALE: 49.6 PG/ML (ref 47–244)
TESTOSTERONE TOTAL: 383 NG/DL (ref 220–1000)

## 2020-09-19 ENCOUNTER — APPOINTMENT (OUTPATIENT)
Dept: GENERAL RADIOLOGY | Age: 80
End: 2020-09-19
Payer: MEDICARE

## 2020-09-19 ENCOUNTER — HOSPITAL ENCOUNTER (EMERGENCY)
Age: 80
Discharge: HOME | End: 2020-09-19
Attending: EMERGENCY MEDICINE
Payer: MEDICARE

## 2020-09-19 VITALS
BODY MASS INDEX: 28.63 KG/M2 | TEMPERATURE: 98.2 F | SYSTOLIC BLOOD PRESSURE: 146 MMHG | WEIGHT: 200 LBS | OXYGEN SATURATION: 97 % | HEART RATE: 86 BPM | DIASTOLIC BLOOD PRESSURE: 86 MMHG | HEIGHT: 70 IN | RESPIRATION RATE: 16 BRPM

## 2020-09-19 PROCEDURE — 99283 EMERGENCY DEPT VISIT LOW MDM: CPT

## 2020-09-19 PROCEDURE — 73030 X-RAY EXAM OF SHOULDER: CPT

## 2020-09-19 PROCEDURE — 6370000000 HC RX 637 (ALT 250 FOR IP): Performed by: NURSE PRACTITIONER

## 2020-09-19 RX ORDER — HYDROCODONE BITARTRATE AND ACETAMINOPHEN 5; 325 MG/1; MG/1
1 TABLET ORAL ONCE
Status: COMPLETED | OUTPATIENT
Start: 2020-09-19 | End: 2020-09-19

## 2020-09-19 RX ORDER — HYDROCODONE BITARTRATE AND ACETAMINOPHEN 5; 325 MG/1; MG/1
1 TABLET ORAL EVERY 8 HOURS PRN
Qty: 9 TABLET | Refills: 0 | Status: SHIPPED | OUTPATIENT
Start: 2020-09-19 | End: 2020-09-22

## 2020-09-19 RX ADMIN — HYDROCODONE BITARTRATE AND ACETAMINOPHEN 1 TABLET: 5; 325 TABLET ORAL at 12:05

## 2020-09-19 ASSESSMENT — PAIN SCALES - GENERAL
PAINLEVEL_OUTOF10: 7
PAINLEVEL_OUTOF10: 5
PAINLEVEL_OUTOF10: 8

## 2020-09-19 ASSESSMENT — PAIN DESCRIPTION - ORIENTATION
ORIENTATION: LEFT
ORIENTATION: LEFT

## 2020-09-19 ASSESSMENT — PAIN DESCRIPTION - PAIN TYPE
TYPE: ACUTE PAIN
TYPE: ACUTE PAIN

## 2020-09-19 ASSESSMENT — PAIN DESCRIPTION - PROGRESSION: CLINICAL_PROGRESSION: GRADUALLY IMPROVING

## 2020-09-19 ASSESSMENT — PAIN DESCRIPTION - LOCATION
LOCATION: SHOULDER
LOCATION: SHOULDER

## 2020-09-19 ASSESSMENT — PAIN DESCRIPTION - FREQUENCY: FREQUENCY: CONTINUOUS

## 2020-09-19 ASSESSMENT — PAIN DESCRIPTION - DESCRIPTORS: DESCRIPTORS: ACHING

## 2020-09-19 NOTE — ED PROVIDER NOTES
ED Attending  CC: No         Department of Emergency Medicine   ED  Provider Note  Admit Date/RoomTime: 9/19/2020 11:21 AM  ED Room: /34    Chief Complaint   Fall (mechanical fall on wednesday, did not hit head, no loc, on xarelto) and Shoulder Injury (left)    History of Present Illness   Source of history provided by:  patient. History/Exam Limitations: none. Frederic Izaguirre is a 79 y.o. old male who has a past medical history of:   Past Medical History:   Diagnosis Date    Atrial fibrillation ([address]     Atrial fibrillation with RVR ([address] 9/23/2011    Basal cell cancer     resected from left leg    Current use of long term anticoagulation     Dyslipidemia     Gastric ulcer approx 2015    Hyperlipidemia     Hypertension     Myocardial infarct ([facility] 1981    PVD (peripheral vascular disease) with claudication ([facility] 1/9/2014    S/P femoral-popliteal bypass surgery 1/9/2014    Shoulder problem     Tachycardia     Type 2 diabetes mellitus without complication ([facility] [facility]     presents to the emergency department by private vehicle and ambulatory, for a fall which occured 4 day(s) prior to arrival. He was reportedly working in his yard while at home prior to incident with complaints of left shoulder pain. The patients tetanus status is up to date. Since onset the symptoms have been moderate in degree. His pain is aggraveated by movement, use and palpation and relieved by rest.  He denies any head injury, loss of consciousness, neck pain, chest pain, abdominal pain, numbness or weakness. Patient was asked several times and every time states he denies hitting his head. He states he landed on his left shoulder. .  ROS    Pertinent positives and negatives are stated within HPI, all other systems reviewed and are negative.     Past Surgical History:   Procedure Laterality Date    ARTERIAL ANEURYSM REPAIR      popliteal artery - Dr. Melany Negrete, 11/2002    ice for their symptoms with moderate improvement. Patient continues to be non-toxic on re-evaluation. Findings were discussed with the patient and reasons to immediately return to the ED were articulated to them. They will follow-up with their PMD and orthopedics. Patient discharged home with sling and Norco for pain. Discussed potential side effects of starting this medication. Patient states verbal understanding. Instructed patient this can increase her risk for sedation and addiction. [place] was reviewed and no signs and symptoms of drug abuse noted. Instructed patient do not drive at discharge receiving medications emerged part. Patient states verbal understanding      Counseling: The emergency provider has spoken with the patient and discussed todays results, in addition to providing specific details for the plan of care and counseling regarding the diagnosis and prognosis. Questions are answered at this time and they are agreeable with the plan. Assessment      1. Shoulder injury, left, initial encounter      Plan   Discharge to home  Patient condition is stable    New Medications     Discharge Medication List as of 9/19/2020 12:38 PM      START taking these medications    Details   HYDROcodone-acetaminophen (NORCO) 5-325 MG per tablet Take 1 tablet by mouth every 8 hours as needed for Pain for up to 3 days. Intended supply: 3 days. Take lowest dose possible to manage pain, Disp-9 tablet,R-0Print           Electronically signed by CUBA Murphy CNP   DD: 9/19/20  **This report was transcribed using voice recognition software. Every effort was made to ensure accuracy; however, inadvertent computerized transcription errors may be present.   END OF ED PROVIDER NOTE         CUBA Murphy CNP  09/19/20 [id number]

## 2021-01-11 ENCOUNTER — TELEPHONE (OUTPATIENT)
Dept: PRIMARY CARE CLINIC | Age: 81
End: 2021-01-11

## 2021-01-11 DIAGNOSIS — Z12.5 SCREENING FOR PROSTATE CANCER: ICD-10-CM

## 2021-01-11 DIAGNOSIS — I10 ESSENTIAL HYPERTENSION: ICD-10-CM

## 2021-01-11 DIAGNOSIS — I25.10 CORONARY ARTERY DISEASE INVOLVING NATIVE CORONARY ARTERY OF NATIVE HEART WITHOUT ANGINA PECTORIS: Primary | Chronic | ICD-10-CM

## 2021-01-11 DIAGNOSIS — E78.2 MIXED HYPERLIPIDEMIA: Chronic | ICD-10-CM

## 2021-01-12 DIAGNOSIS — E78.2 MIXED HYPERLIPIDEMIA: Chronic | ICD-10-CM

## 2021-01-12 DIAGNOSIS — Z12.5 SCREENING FOR PROSTATE CANCER: ICD-10-CM

## 2021-01-12 DIAGNOSIS — I10 ESSENTIAL HYPERTENSION: ICD-10-CM

## 2021-01-12 LAB
ALBUMIN SERPL-MCNC: 4.3 G/DL (ref 3.5–5.2)
ALP BLD-CCNC: 61 U/L (ref 40–129)
ALT SERPL-CCNC: 33 U/L (ref 0–40)
ANION GAP SERPL CALCULATED.3IONS-SCNC: 11 MMOL/L (ref 7–16)
AST SERPL-CCNC: 24 U/L (ref 0–39)
BASOPHILS ABSOLUTE: 0.04 E9/L (ref 0–0.2)
BASOPHILS RELATIVE PERCENT: 0.7 % (ref 0–2)
BILIRUB SERPL-MCNC: 0.5 MG/DL (ref 0–1.2)
BUN BLDV-MCNC: 22 MG/DL (ref 8–23)
CALCIUM SERPL-MCNC: 9.2 MG/DL (ref 8.6–10.2)
CHLORIDE BLD-SCNC: 104 MMOL/L (ref 98–107)
CHOLESTEROL, TOTAL: 141 MG/DL (ref 0–199)
CO2: 25 MMOL/L (ref 22–29)
CREAT SERPL-MCNC: 1 MG/DL (ref 0.7–1.2)
EOSINOPHILS ABSOLUTE: 0.21 E9/L (ref 0.05–0.5)
EOSINOPHILS RELATIVE PERCENT: 3.8 % (ref 0–6)
GFR AFRICAN AMERICAN: >60
GFR NON-AFRICAN AMERICAN: >60 ML/MIN/1.73
GLUCOSE BLD-MCNC: 88 MG/DL (ref 74–99)
HCT VFR BLD CALC: 46.8 % (ref 37–54)
HDLC SERPL-MCNC: 56 MG/DL
HEMOGLOBIN: 15 G/DL (ref 12.5–16.5)
IMMATURE GRANULOCYTES #: 0.01 E9/L
IMMATURE GRANULOCYTES %: 0.2 % (ref 0–5)
LDL CHOLESTEROL CALCULATED: 70 MG/DL (ref 0–99)
LYMPHOCYTES ABSOLUTE: 1.73 E9/L (ref 1.5–4)
LYMPHOCYTES RELATIVE PERCENT: 30.9 % (ref 20–42)
MCH RBC QN AUTO: 32.1 PG (ref 26–35)
MCHC RBC AUTO-ENTMCNC: 32.1 % (ref 32–34.5)
MCV RBC AUTO: 100 FL (ref 80–99.9)
MONOCYTES ABSOLUTE: 0.44 E9/L (ref 0.1–0.95)
MONOCYTES RELATIVE PERCENT: 7.9 % (ref 2–12)
NEUTROPHILS ABSOLUTE: 3.17 E9/L (ref 1.8–7.3)
NEUTROPHILS RELATIVE PERCENT: 56.5 % (ref 43–80)
PDW BLD-RTO: 13.2 FL (ref 11.5–15)
PLATELET # BLD: 188 E9/L (ref 130–450)
PMV BLD AUTO: 11 FL (ref 7–12)
POTASSIUM SERPL-SCNC: 4.5 MMOL/L (ref 3.5–5)
PROSTATE SPECIFIC ANTIGEN: 1.01 NG/ML (ref 0–4)
RBC # BLD: 4.68 E12/L (ref 3.8–5.8)
SODIUM BLD-SCNC: 140 MMOL/L (ref 132–146)
TOTAL PROTEIN: 7.4 G/DL (ref 6.4–8.3)
TRIGL SERPL-MCNC: 75 MG/DL (ref 0–149)
VLDLC SERPL CALC-MCNC: 15 MG/DL
WBC # BLD: 5.6 E9/L (ref 4.5–11.5)

## 2021-01-19 ENCOUNTER — OFFICE VISIT (OUTPATIENT)
Dept: PRIMARY CARE CLINIC | Age: 81
End: 2021-01-19
Payer: MEDICARE

## 2021-01-19 VITALS
OXYGEN SATURATION: 98 % | WEIGHT: 207 LBS | HEART RATE: 73 BPM | TEMPERATURE: 97 F | BODY MASS INDEX: 29.63 KG/M2 | HEIGHT: 70 IN | DIASTOLIC BLOOD PRESSURE: 68 MMHG | SYSTOLIC BLOOD PRESSURE: 120 MMHG

## 2021-01-19 DIAGNOSIS — I70.209 ATHEROSCLEROTIC PERIPHERAL VASCULAR DISEASE (HCC): ICD-10-CM

## 2021-01-19 DIAGNOSIS — R73.03 PRE-DIABETES: ICD-10-CM

## 2021-01-19 DIAGNOSIS — I25.10 CORONARY ARTERY DISEASE INVOLVING NATIVE CORONARY ARTERY OF NATIVE HEART WITHOUT ANGINA PECTORIS: Chronic | ICD-10-CM

## 2021-01-19 DIAGNOSIS — I71.40 ABDOMINAL AORTIC ANEURYSM (AAA) WITHOUT RUPTURE: Chronic | ICD-10-CM

## 2021-01-19 DIAGNOSIS — R73.9 BLOOD GLUCOSE ELEVATED: ICD-10-CM

## 2021-01-19 DIAGNOSIS — Z12.11 SCREEN FOR COLON CANCER: ICD-10-CM

## 2021-01-19 DIAGNOSIS — I10 ESSENTIAL HYPERTENSION: ICD-10-CM

## 2021-01-19 DIAGNOSIS — E78.2 MIXED HYPERLIPIDEMIA: Primary | Chronic | ICD-10-CM

## 2021-01-19 DIAGNOSIS — I48.21 PERMANENT ATRIAL FIBRILLATION (HCC): ICD-10-CM

## 2021-01-19 LAB
BILIRUBIN, POC: NORMAL
BLOOD URINE, POC: NORMAL
CLARITY, POC: CLEAR
COLOR, POC: YELLOW
CONTROL: NORMAL
GLUCOSE URINE, POC: NORMAL
HBA1C MFR BLD: 6.1 %
HEMOCCULT STL QL: NORMAL
KETONES, POC: NORMAL
LEUKOCYTE EST, POC: NORMAL
NITRITE, POC: NORMAL
PH, POC: 5.5
PROTEIN, POC: NORMAL
SPECIFIC GRAVITY, POC: 1.03
UROBILINOGEN, POC: 0.2

## 2021-01-19 PROCEDURE — 99215 OFFICE O/P EST HI 40 MIN: CPT | Performed by: INTERNAL MEDICINE

## 2021-01-19 PROCEDURE — 82274 ASSAY TEST FOR BLOOD FECAL: CPT | Performed by: INTERNAL MEDICINE

## 2021-01-19 PROCEDURE — 83036 HEMOGLOBIN GLYCOSYLATED A1C: CPT | Performed by: INTERNAL MEDICINE

## 2021-01-19 PROCEDURE — 81002 URINALYSIS NONAUTO W/O SCOPE: CPT | Performed by: INTERNAL MEDICINE

## 2021-01-19 RX ORDER — LOSARTAN POTASSIUM 25 MG/1
25 TABLET ORAL DAILY
COMMUNITY
End: 2021-01-19

## 2021-01-19 ASSESSMENT — PATIENT HEALTH QUESTIONNAIRE - PHQ9
SUM OF ALL RESPONSES TO PHQ9 QUESTIONS 1 & 2: 0
1. LITTLE INTEREST OR PLEASURE IN DOING THINGS: 0

## 2021-01-19 NOTE — PROGRESS NOTES
Alexsander Sanchezs  1/19/21     Chief Complaint   Patient presents with    Hyperlipidemia     physical review bw        Allergies   Allergen Reactions    Diltiazem Hcl      Bradycardia          Current Outpatient Medications   Medication Sig Dispense Refill    losartan (COZAAR) 25 MG tablet Take 12.5 mg by mouth daily      celecoxib (CELEBREX) 100 MG capsule Take 100 mg by mouth as needed       rosuvastatin (CRESTOR) 5 MG tablet       Cholecalciferol (VITAMIN D3) 2000 units CAPS Take 1 capsule by mouth daily      RESTASIS 0.05 % ophthalmic emulsion       aspirin 81 MG chewable tablet Take 81 mg by mouth daily      rivaroxaban (XARELTO) 20 MG TABS tablet Take 20 mg by mouth daily (with breakfast)       LUMIGAN 0.01 % SOLN Place 1 drop into both eyes daily. No current facility-administered medications for this visit. HPI: Patient comes in for his annual physical.  He is now 79years of age. Currently feels well. He denies any chest pain or shortness of breath. He remains on all of his usual meds and supplements the same as listed on his med list, which was reviewed with him. He is physically active and exercises on a regular basis. He is currently following a low-cholesterol, low carbohydrate, heart healthy diet. He does complain of persistent left shoulder pain following a fall about 5 months ago. He is following up with his orthopedic surgeon for it and is going to be getting some soft wave acoustic therapy. He follows up with his vascular surgeon annually for his atherosclerotic peripheral vascular disease, abdominal aortic aneurysm, and history of popliteal aneurysm. He has been stable in that regard.     Review of Systems    General:   no weight change, no malaise, no fatigue, no change in appetite, no sleep disturbance, no fever/chills, no night sweats,  Skin:                no abnormal pigmentation, no rash, no scaling, no itching, no masses, no hair or nail changes  Eyes: no blurring, no diplopia, no eye pain, no glaucoma, no cataracts  ENT:                 no hearing loss, no tinnitus, no vertigo, no nosebleed, no nasal congestion, no rhinorrhea, no sore throat, no jaw pain, no hoarseness,  no bleeding    Neck:     no node tenderness , not rigid, no masses   Respiratory:           no cough, no sputum, no coughing blood, no pleuritic , no chest pain, no dyspnea,  no wheezing  Cardiovascular:         no angina, no chest pain  No syncope, no pedal edema , no orthopnea, no PND, no palpitations, no claudication  Gastrointestinal  no nausea, no vomiting, no heartburn, no diarrhea, no constipation, no bloating, no abdominal pain, no rectal pain, no bleeding no hemorrhoids, no hernia. Genitourinary:            no urinary urgency, no frequency, no dysuria, no nocturia, no hesitancy, no  incontinence, no bleeding, no stones  Musculoskeletal:        Left shoulder pain since a fall several months ago. Neurologic:                 no paralysis, no paresis, no  paresthesia, no seizures, no tremors, no headaches, no tumors , no stroke, no speech issues,  No incoordination, no head trauma, no memory loss/concentration  Hematologic:              no anemia, no abnormal bleeding / bruising, no fever, no chills, no night sweats, no wollen glands, no unexplained weight loss  Endocrine:        no heat or cold intolerance and no polyphagia, polydipsia,  or polyuria  Psych:   no depression no anxiety, no suicidal or homicidal thoughts, no irritability, no decreased energy, no trouble falling asleep, no early awakening , no trouble concentrating                     Physical Exam:  Patient is an 79 y.o. male     Constitutional:  General Appearance: Well-appearing. Level of Distress: NAD. Ambulation: ambulating normally    Psychiatric:  Insight: good judgment: Mental status: normal mood and affect. Orientation: oriented to time place and person.   Memory: recent memory normal and remote memory normal.     Head: normocephalic and atraumatic. Eyes:  Lids and Conjunctiva: Non-injected and no pallor. Pupils: PERRLA, Corneas: grossly intact. EOMI, Lens: clear. Sclerae: non-icteric. Vision: peripheral vision grossly intact and acuity grossly intact. ENMT:  Ears: no lesions on external ear. TMs clear and TM mobility normal.  Hearing: no hearing loss. Nose: No lesions on external nose, septal deviation sinus tenderness or nasal discharge and nares patent and nasal passages clear. Lips, Teeth and Gums:  no mouth or lip ulcers or bleeding gums and normal dentition. Oropharynx: no erythema or exudates and moist mucous membranes and tonsils not enlarged. Neck:  Neck supple, FROM, trachea midline, and no masses. Lymph nodes: no cervical LAD, supraclavicular LAD, axillary LAD, or inguinal LAD. Thyroid: non-tender and no enlargement. Lungs:  Respiratory effort, no dyspnea. Auscultation: no rales/crackles or rhonchi and breath sounds normal, good air movement and CTA except as noted. Cardiovascular: Apical impulse:not displaced. Heart: Auscultation: normal S1 and S2, no murmurs, rubs or gallops; and RRR. Neck vessels: no carotid bruits. Pulses including femoral/pedal: normal throughout. Abdomen: Bowel sounds: normal.  Inspection and Palpation: no tenderness, guarding, masses, rebound tenderness or CVA tenderness and soft and non-distended. Liver: non-tender and no hepatomegaly. Spleen: non-tender and no splenomegaly. Hernia: none palpable. Musculoskeletal: Motor Strength and Tone: normal tone and motor strength. Joints, Bones and Muscles: no malalignment, tenderness or bony abnormalities and normal movement of all extremities. Left shoulder somewhat painful on range of motion. Extremities: no cyanosis, edema, varicosities, or palpable cord. Neurologic:  Gait and Station: normal gait and station. Cranial nerves:grossly intact.  Sensation:grossly intact and monofilament test intact. Reflexes: DTRs 2+ bilaterally throughout. Coordination and Cerebellum: finger to nose intact and no tremor. Skin: Inspection and palpation: no rash, lesions, ulcer, induration, nodules, jaundice or abnormal nevi and good turgor. Nails: normal.     Back:  Thoracolumbar Appearance: normal curvature. I have examined the patient's feet and found no evidence of deformities, calluses, ulcerations, or evidence of neuropathy. Lab Results   Component Value Date    WBC 5.6 01/12/2021    HGB 15.0 01/12/2021    HCT 46.8 01/12/2021     01/12/2021    CHOL 141 01/12/2021    TRIG 75 01/12/2021    HDL 56 01/12/2021    ALT 33 01/12/2021    AST 24 01/12/2021    TSH 2.600 06/30/2020    PSA 1.01 01/12/2021    INR 1.5 10/11/2011    LABA1C 6.1 01/19/2021      Lab Results   Component Value Date     01/12/2021    K 4.5 01/12/2021     01/12/2021    CO2 25 01/12/2021    BUN 22 01/12/2021    CREATININE 1.0 01/12/2021    GLUCOSE 88 01/12/2021    CALCIUM 9.2 01/12/2021    PROT 7.4 01/12/2021    LABALBU 4.3 01/12/2021    BILITOT 0.5 01/12/2021    ALKPHOS 61 01/12/2021    AST 24 01/12/2021    ALT 33 01/12/2021    LABGLOM >60 01/12/2021    GFRAA >60 01/12/2021            Assessment:    Baljinder Garcia was seen today for hyperlipidemia. Diagnoses and all orders for this visit:    Mixed hyperlipidemia, well controlled on rosuvastatin 5 mg daily. Pre-diabetes, hemoglobin A1c 6.1% today, which technically puts him in the diabetic range. -     POCT Urinalysis no Micro  -     POCT glycosylated hemoglobin (Hb A1C)    Screen for colon cancer  -     POCT Fit Test; Future  -     POCT Fit Test    Coronary artery disease involving native coronary artery of native heart without angina pectoris, stable and followed by his cardiologist.    Abdominal aortic aneurysm (AAA) without rupture ([address] stable and followed by vascular surgery.     Permanent atrial fibrillation ([address] stable and rate controlled and followed by his cardiologist.    Essential hypertension, controlled on current meds. Atherosclerotic peripheral vascular disease ([address] stable and followed by vascular surgery. Discussion Notes: Patient will continue all of his current meds and supplements the same as listed on his med list.  He is encouraged to continue a low-cholesterol, low refined carbohydrate, heart healthy diet and regular exercise. He will follow-up with his cardiologist and vascular surgeon as per their instructions. Return here as needed or in 6 months for follow-up visit and labs including a CMP, hemoglobin A1c, and lipid panel. We will recheck his urine analysis in 1 month.

## 2021-01-20 RX ORDER — LOSARTAN POTASSIUM 25 MG/1
TABLET ORAL
Qty: 45 TABLET | Refills: 3 | Status: SHIPPED
Start: 2021-01-20 | End: 2021-08-16 | Stop reason: HOSPADM

## 2021-04-12 RX ORDER — ROSUVASTATIN CALCIUM 5 MG/1
5 TABLET, COATED ORAL DAILY
Qty: 90 TABLET | Refills: 3 | Status: SHIPPED
Start: 2021-04-12 | End: 2022-04-07 | Stop reason: SDUPTHER

## 2021-06-17 DIAGNOSIS — I73.9 PVD (PERIPHERAL VASCULAR DISEASE) WITH CLAUDICATION (HCC): ICD-10-CM

## 2021-06-17 DIAGNOSIS — I72.4 POPLITEAL ARTERY ANEURYSM (HCC): ICD-10-CM

## 2021-06-17 DIAGNOSIS — Z95.828 S/P FEMORAL-POPLITEAL BYPASS SURGERY: ICD-10-CM

## 2021-06-17 DIAGNOSIS — I71.40 ABDOMINAL AORTIC ANEURYSM (AAA) WITHOUT RUPTURE: Primary | ICD-10-CM

## 2021-07-01 ENCOUNTER — HOSPITAL ENCOUNTER (OUTPATIENT)
Dept: CARDIOLOGY | Age: 81
Discharge: HOME | End: 2021-07-01
Payer: MEDICARE

## 2021-07-01 ENCOUNTER — OFFICE VISIT (OUTPATIENT)
Dept: VASCULAR SURGERY | Age: 81
End: 2021-07-01
Payer: MEDICARE

## 2021-07-01 VITALS — WEIGHT: 203 LBS | BODY MASS INDEX: 29.06 KG/M2 | HEIGHT: 70 IN

## 2021-07-01 DIAGNOSIS — Z95.828 S/P FEMORAL-POPLITEAL BYPASS SURGERY: ICD-10-CM

## 2021-07-01 DIAGNOSIS — I71.40 ABDOMINAL AORTIC ANEURYSM (AAA) WITHOUT RUPTURE: Primary | Chronic | ICD-10-CM

## 2021-07-01 DIAGNOSIS — I73.9 PVD (PERIPHERAL VASCULAR DISEASE) WITH CLAUDICATION (HCC): ICD-10-CM

## 2021-07-01 DIAGNOSIS — I72.4 POPLITEAL ARTERY ANEURYSM (HCC): ICD-10-CM

## 2021-07-01 DIAGNOSIS — I71.40 ABDOMINAL AORTIC ANEURYSM (AAA) WITHOUT RUPTURE: ICD-10-CM

## 2021-07-01 PROCEDURE — 99214 OFFICE O/P EST MOD 30 MIN: CPT | Performed by: SURGERY

## 2021-07-01 PROCEDURE — 93978 VASCULAR STUDY: CPT

## 2021-07-01 PROCEDURE — 93923 UPR/LXTR ART STDY 3+ LVLS: CPT

## 2021-07-01 PROCEDURE — 93926 LOWER EXTREMITY STUDY: CPT

## 2021-07-01 NOTE — PROGRESS NOTES
Chief Complaint:   Chief Complaint   Patient presents with    Circulatory Problem     AAA and pt. states he has left foot numbness         HPI: Patient came to the office, for the evaluation of multiple medical and vascular issues including abdominal aortic rhythm, right popliteal artery disease, status post left femoral-popliteal bypass surgery, overall doing well    Denies any chest pain or palpitation      Patient denies any focal lateralizing neurological symptoms like loss of speech, vision or loss of function of extremity    Patient can walk a few blocks , and denies any symptoms of rest pain    Allergies   Allergen Reactions    Diltiazem Hcl      Bradycardia         Current Outpatient Medications   Medication Sig Dispense Refill    rosuvastatin (CRESTOR) 5 MG tablet Take 1 tablet by mouth daily 90 tablet 3    rivaroxaban (XARELTO) 20 MG TABS tablet Take 1 tablet by mouth daily (with breakfast) Indications: pt takes with dinner 90 tablet 3    losartan (COZAAR) 25 MG tablet Take 1/2 tab daily 45 tablet 3    losartan (COZAAR) 25 MG tablet Take 12.5 mg by mouth daily      celecoxib (CELEBREX) 100 MG capsule Take 100 mg by mouth as needed       Cholecalciferol (VITAMIN D3) 2000 units CAPS Take 1 capsule by mouth daily      RESTASIS 0.05 % ophthalmic emulsion       aspirin 81 MG chewable tablet Take 81 mg by mouth daily      LUMIGAN 0.01 % SOLN Place 1 drop into both eyes daily. No current facility-administered medications for this visit.        Past Medical History:   Diagnosis Date    [facility]     Atrial fibrillation with RVR ([facility] [facility] 9/23/2011    Basal cell cancer     resected from left leg    Current use of long term anticoagulation     Dyslipidemia     Gastric ulcer approx 2015    Hyperlipidemia     Hypertension     Myocardial infarct ([address] 1981    PVD (peripheral vascular disease) with claudication ([facility] [facility] 1/9/2014    S/P femoral-popliteal bypass surgery 1/9/2014    Shoulder problem     Tachycardia     Type 2 diabetes mellitus without complication [facility])        Past Surgical History:   Procedure Laterality Date    ARTERIAL ANEURYSM REPAIR      popliteal artery - Dr. Brigida Loja, 2002    CARDIOVERSION  10/19/2011    DCCV    CORONARY ANGIOPLASTY  1988, 1988    CORONARY ARTERY BYPASS GRAFT  2002    ECHO COMPL W DOP COLOR FLOW  2011         UPPER GASTROINTESTINAL ENDOSCOPY         Family History   Problem Relation Age of Onset    Cancer Mother         Esophageal CA    Cancer Father         Lung CA       Social History     Socioeconomic History    Marital status:      Spouse name: Not on file    Number of children: Not on file    Years of education: Not on file    Highest education level: Not on file   Occupational History    Not on file   Tobacco Use    Smoking status: Former Smoker     Packs/day: 2.00     Years: 20.00     Pack years: 40.00     Types: Cigarettes     Quit date: 9/3/1980     Years since quittin.8    Smokeless tobacco: Never Used   Vaping Use    Vaping Use: Never used   Substance and Sexual Activity    Alcohol use: Yes     Alcohol/week: 0.0 standard drinks     Comment: Wine a few glasses a week and beer    Drug use: No    Sexual activity: Not on file   Other Topics Concern    Not on file   Social History Narrative    Not on file     Social Determinants of Health     Financial Resource Strain:     Difficulty of Paying Living Expenses:    Food Insecurity:     Worried About Running Out of Food in the Last Year:     [address] in the Last Year:    Transportation Needs:     Lack of Transportation (Medical):      Lack of Transportation (Non-Medical):    Physical Activity:     Days of Exercise per Week:     Minutes of Exercise per Session:    Stress:     Feeling of Stress :    Social Connections:     Frequency of Communication with Friends and Family:     Frequency ultrasound the abdomen revealed stable aneurysm, 3.2 cm, no change from last year, patient was instead call and come to hospital any abdominal pain or back pain    The ultrasound of the right popliteal artery revealed small aneurysm 1.8 cm, unchanged from last year, patient was instructed to call immediately if any pain in the right leg    The lower extremity arterial Doppler study revealed normal ankle medics with patent left femoral-popliteal bypass surgery    The patient is reassured              Patient was instructed to continue walking program and to call if any worsening of symptoms and to call if any focal lateralizing neurological symptoms like loss of speech, vision or loss of function of extremity. All the questions were answered. No orders of the defined types were placed in this encounter. No orders of the defined types were placed in this encounter. Indicated follow-up: Return if symptoms worsen or fail to improve.

## 2021-07-01 NOTE — PROGRESS NOTES
[facility] Heart & Vascular Lab - [facility]    This is a pre read worksheet - prior to official physician interpretation    Cas Valle  1940  Date of study: 7/1/21      Study : Right Popliteal Artery Duplex    RIGHT POPLITEAL ARTERY   1.8 x 1.7 cm            LAST STUDY  6/25/2020  1.8 cm

## 2021-07-01 NOTE — PROGRESS NOTES
[facility] Heart & Vascular Lab - [facility]    This is a pre read worksheet - prior to official physician interpretation    Diony Franco  1940  Date of study: 7/1/21    Indication for study:  AAA  Study :  Aortic Ultrasound    Diameter Aorta:     Proximal:   cm     Mid:   cm     Distal:  3.2 x 3.2 cm     Right iliac: 2.2 x 2.2 cm     Left iliac: 2.0 x 2.0 cm    Additional comments:         LAST STUDY  6/25/2020  3.2 cm  Rt 2.2  Lt 1.9

## 2021-07-05 NOTE — PROCEDURES
[address]                  [address] [facility],  [facility]                                VASCULAR REPORT    PATIENT NAME: Gayle De La O                         :        1940  MED REC NO:   [id number]                            ROOM:  ACCOUNT NO:   [de-identified]                           ADMIT DATE: 2021  PROVIDER:     Aryan Naik MD    DATE OF PROCEDURE:  2021    ULTRASOUND ABDOMINAL AORTA    INDICATION:  History of abdominal aortic aneurysm. Duplex scanning of the abdominal aorta revealed dilatation of aorta 3.2  x 3.2 cm with slightly dilated right iliac artery of 2.2 cm and left  iliac artery of 2 cm diameter. IMPRESSION:  Abdominal aortic aneurysm, maximum diameter of 3.2 cm,  unchanged from last year.         Artist MD Jonathan    D: 2021 19:25:31       T: 2021 19:27:27     [email or web address]  Job#: [id number]     Doc#: [id number]    CC:

## 2021-07-05 NOTE — PROCEDURES
[address]                  [address] [facility],  [facility]                                VASCULAR REPORT    PATIENT NAME: Leia Molina                         :        1940  MED REC NO:   [id number]                            ROOM:  ACCOUNT NO:   [de-identified]                           ADMIT DATE: 2021  PROVIDER:     Jodee Mancilla MD    DATE OF PROCEDURE:  2021    ULTRASOUND RIGHT POPLITEAL ARTERY    INDICATION:  Right popliteal artery aneurysm. FINDINGS:  Duplex scanning of the right popliteal artery revealed  dilatation of the artery 1.7 x 1.8 cm. 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         Chantal Escoto MD    D: 2021 19:26:00       T: 2021 19:27:59     [email or web address]  Job#: [id number]     Doc#: [id number]    CC:

## 2021-07-05 NOTE — PROCEDURES
[address]                  [address] [facility],  [facility]                                VASCULAR REPORT    PATIENT NAME: Joycelyn Cobb                         :        1940  MED REC NO:   [id number]                            ROOM:  ACCOUNT NO:   [de-identified]                           ADMIT DATE: 2021  PROVIDER:     Amor De Luna MD    DATE OF PROCEDURE:  2021    LOWER EXTREMITY ARTERIAL DOPPLER STUDY    INDICATION:  Repair of left popliteal artery aneurysm with placement of  femoropopliteal bypass graft. Lower extremity arterial Doppler study revealed that the patient does  have normal ankle-arm index with normal triphasic ankle Doppler  tracings. Good arterial flow to both feet based upon the pulse volume  recordings over the metatarsals.         Ines Jameson MD    D: 2021 19:26:44       T: 2021 19:28:32     [id number]  Job#: [id number]     Doc#: [id number]    CC:

## 2021-07-13 DIAGNOSIS — R73.03 PRE-DIABETES: ICD-10-CM

## 2021-07-13 DIAGNOSIS — I10 ESSENTIAL HYPERTENSION: ICD-10-CM

## 2021-07-13 DIAGNOSIS — E78.2 MIXED HYPERLIPIDEMIA: Chronic | ICD-10-CM

## 2021-07-13 LAB
ALBUMIN SERPL-MCNC: 4.5 G/DL (ref 3.5–5.2)
ALP BLD-CCNC: 65 U/L (ref 40–129)
ALT SERPL-CCNC: 33 U/L (ref 0–40)
ANION GAP SERPL CALCULATED.3IONS-SCNC: 10 MMOL/L (ref 7–16)
AST SERPL-CCNC: 24 U/L (ref 0–39)
BILIRUB SERPL-MCNC: 0.6 MG/DL (ref 0–1.2)
BUN BLDV-MCNC: 25 MG/DL (ref 6–23)
CALCIUM SERPL-MCNC: 10 MG/DL (ref 8.6–10.2)
CHLORIDE BLD-SCNC: 103 MMOL/L (ref 98–107)
CHOLESTEROL, TOTAL: 143 MG/DL (ref 0–199)
CO2: 26 MMOL/L (ref 22–29)
CREAT SERPL-MCNC: 1.1 MG/DL (ref 0.7–1.2)
GFR AFRICAN AMERICAN: >60
GFR NON-AFRICAN AMERICAN: >60 ML/MIN/1.73
GLUCOSE BLD-MCNC: 114 MG/DL (ref 74–99)
HBA1C MFR BLD: 6.3 % (ref 4–5.6)
HDLC SERPL-MCNC: 47 MG/DL
LDL CHOLESTEROL CALCULATED: 76 MG/DL (ref 0–99)
POTASSIUM SERPL-SCNC: 4.4 MMOL/L (ref 3.5–5)
SODIUM BLD-SCNC: 139 MMOL/L (ref 132–146)
TOTAL PROTEIN: 7.8 G/DL (ref 6.4–8.3)
TRIGL SERPL-MCNC: 100 MG/DL (ref 0–149)
VLDLC SERPL CALC-MCNC: 20 MG/DL

## 2021-07-16 ENCOUNTER — OFFICE VISIT (OUTPATIENT)
Dept: CARDIOLOGY CLINIC | Age: 81
End: 2021-07-16
Payer: MEDICARE

## 2021-07-16 VITALS
SYSTOLIC BLOOD PRESSURE: 126 MMHG | RESPIRATION RATE: 16 BRPM | DIASTOLIC BLOOD PRESSURE: 64 MMHG | HEIGHT: 71 IN | HEART RATE: 63 BPM | BODY MASS INDEX: 28.85 KG/M2 | WEIGHT: 206.1 LBS

## 2021-07-16 DIAGNOSIS — I48.21 PERMANENT ATRIAL FIBRILLATION (HCC): Primary | ICD-10-CM

## 2021-07-16 DIAGNOSIS — I25.10 CORONARY ARTERY DISEASE INVOLVING NATIVE CORONARY ARTERY OF NATIVE HEART WITHOUT ANGINA PECTORIS: ICD-10-CM

## 2021-07-16 DIAGNOSIS — Z79.01 CHRONIC ANTICOAGULATION: ICD-10-CM

## 2021-07-16 DIAGNOSIS — I10 ESSENTIAL HYPERTENSION: ICD-10-CM

## 2021-07-16 PROCEDURE — 99213 OFFICE O/P EST LOW 20 MIN: CPT | Performed by: INTERNAL MEDICINE

## 2021-07-16 PROCEDURE — 93000 ELECTROCARDIOGRAM COMPLETE: CPT | Performed by: INTERNAL MEDICINE

## 2021-07-16 NOTE — PROGRESS NOTES
OUTPATIENT CARDIOLOGY FOLLOW-UP    Name: Mariana Villarreal    Age: 79 y.o. Primary Care Physician: Dallas Baca MD    Date of Service: 7/16/2021    Chief Complaint: Permanent atrial fibrillation, CAD    Interim History:  Previously followed by Dr. Armen Gutiérrez; he established care with me in 4/2019. No new cardiac complaints since last cardiology evaluation (\"feel good\"). He denies recent chest pain, worsening SOB, palpitations, syncope, PND, or orthopnea. Rate controlled atrial fibrillation on EKG. He walks at the mall on a regular basis (4-5 laps). He is friends with Phillip Diamond and Alvin Arias.     Review of Systems:   Cardiac: As per HPI  General: No fever, chills  Pulmonary: As per HPI  HEENT: No visual disturbances, difficult swallowing  GI: No nausea, vomiting  : No dysuria, hematuria  Endocrine: No thyroid disease or DM  Musculoskeletal: BREAUX x 4, no focal motor deficits  Skin: Intact, no rashes  Neuro: No headache, seizures  Psych: Currently with no depression, anxiety    Past Medical History:  Past Medical History:   Diagnosis Date    Atrial fibrillation ([address]     Atrial fibrillation with RVR ([facility] 9/23/2011    Basal cell cancer     resected from left leg    Current use of long term anticoagulation     Dyslipidemia     Gastric ulcer approx 2015    Hyperlipidemia     Hypertension     Myocardial infarct ([address] 1981    PVD (peripheral vascular disease) with claudication ([facility] [address] [facility] 1/9/2014    S/P femoral-popliteal bypass surgery 1/9/2014    Shoulder problem     Tachycardia     Type 2 diabetes mellitus without complication [facility])        Past Surgical History:  Past Surgical History:   Procedure Laterality Date    ARTERIAL ANEURYSM REPAIR      popliteal artery - Dr. Renee Lopez, 11/2002    CARDIOVERSION  10/19/2011    DCCV    CORONARY ANGIOPLASTY  7/26/1988, 9/19/1988    CORONARY ARTERY BYPASS GRAFT  11/26/2002    ECHO COMPL W DOP COLOR FLOW  9/24/2011 01/12/2021    HCT 46.8 01/12/2021    .0 (H) 01/12/2021     01/12/2021     Lab Results   Component Value Date    ALT 33 07/13/2021    AST 24 07/13/2021    ALKPHOS 65 07/13/2021    BILITOT 0.6 07/13/2021     Lab Results   Component Value Date    INR 1.5 10/11/2011    INR 1.3 09/24/2011    PROTIME 12.9 (H) 10/11/2011    PROTIME 12.8 (H) 09/24/2011     Lab Results   Component Value Date    TSH 2.600 06/30/2020     Lab Results   Component Value Date    LABA1C 6.3 (H) 07/13/2021     No results found for: EAG  Lab Results   Component Value Date    CHOL 143 07/13/2021    CHOL 141 01/12/2021    CHOL 129 06/30/2020     Lab Results   Component Value Date    TRIG 100 07/13/2021    TRIG 75 01/12/2021    TRIG 71 06/30/2020     Lab Results   Component Value Date    HDL 47 07/13/2021    HDL 56 01/12/2021    HDL 47 06/30/2020     Lab Results   Component Value Date    LDLCALC 76 07/13/2021    LDLCALC 70 01/12/2021    LDLCALC 68 06/30/2020     Lab Results   Component Value Date    LABVLDL 20 07/13/2021    LABVLDL 15 01/12/2021    LABVLDL 14 06/30/2020     No results found for: CHOLHDLRATIO  No results for input(s): PROBNP in the last 72 hours.     Cardiac Tests:  ECG: atrial fibrillation, rate 63, NSSTT changes    Echocardiogram: 10/22/18 (Dr. Armen Gutiérrez)   Left ventricular internal dimensions are normal.   Left ventricular wall thickness is moderately hypertrophied.   The left ventricle basal inferior wall appears akinetic.   Overall left ventricular systolic function is mildly impaired.   The ejection fraction is estimated to be 47% by the biplane method of disks.   Diastole could not be fully assessed.   The left atrium is severely enlarged as determined by the left atrial volume index.   The right atrium appears to be enlarged.   Normal mitral leaflet structure and coaptation with no more than mild, centrally directed, mitral insufficiency.   The aortic valve appears mildly sclerotic.   Mild aortic regurgitation is noted.    Exercise nuclear stress test: 10/10/18  1. Exercise EKG was negative. 2. The patient experienced no chest pain with exercise. 3. The myocardial perfusion imaging was normal with attenuation artifact   4. Overall left ventricular systolic function was abnormal without regional wall motion abnormalities. 5. Parham treadmill score was 7 implying low risk.    6. Exercise capacity was above average.    7. Intermediate risk general exercise treadmill test. Due to LV dysfunction. 8. Gated SPECT left ventricular ejection fraction was calculated to be 43%, with hypokinesis of the global wall. ASSESSMENT / PLAN:  1.  Acute inferior wall MI, 10/1981. Medical therapy.    2. Cath with CX angioplasty, 1988. 3. Cath with CX angioplasty, 1988.  4. Worsening chest pain prompting catheterization, 2002.    5. CABG, [facility], [facility], Dr. Aviva Khalil. LIMA-LAD, free pedicle ISAIAH-diagonal, left radial graft-PDA and SVG-OM branch of CX.  6. 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 Brody Solitario.    7. Abdominal aortic US, 2008. Abdominal aortic maximal diameter of 3 cm. No definite aneurysm seen.    8. Dyslipidemia. Total cholesterol 141, HDL 41, LDL 76 - .  9. Echo, 2008. Normal LV size and overall systolic function. LA dilation present. Normal valves. LA diameter 4.7 cm. 10. Exercise MPS, 2008. EF 56%. Abnormal septal motion consistent with postop state. Mild inferior hypokinesis. No ischemia or scarring. No TID. Low risk exam.  11. Family history negative for early coronary disease. His father  of cancer and his mother had esophageal cancer. 12. Abdominal aortic US, 2010. Atherosclerotic change with mild dilation of the distal abdominal aorta with maximum diameter 3 cm, unchanged from 2008, followed yearly by Bety Fritz.    13.  Lower extremity arterial duplex study revealed thrombosed aneurysm, left popliteal fossa with normal popliteal artery flow. Normal JACINDA and normal toe pressures consistent with no significant peripheral ischemia and good peripheral flow.    14. Exercise MPS, 03/18/2010. 10.2 METS, >85% MPHR. EF 71%. Septal wall motion abnormality consistent with CABG. Lateral reversible ischemia 11% LV mass. Moderate risk.    15. Intolerant of beta blockers due to lightheadedness, bradycardia and fatigue. Cardizem intolerant due to bradycardia - 09/2011. 16. [facility] admission for AF/RVR, 09/23/2011. Echo showed LAE, but otherwise unremarkable. Exercise MPS to 10 METS, 85% MPHR showed normal perfusion with LVEF 52%. Pradaxa started.    17. Elevated [id number] score  18. Two pack per day smoker for 20 years, abstinent since 1980.    19. Successful DCCV at 100 watt seconds, R [address], 10/19/2011.    20. OP evaluation, 03/15/2012. AF, ventricular response 76 per minute. Amiodarone therapy initiated as an OP, initially 200 mg bid followed by 200 mg qd.  21. PUD, without symptoms, but with heme positive stools noted 10/2014. NSAID's including aspirin stopped. 22. GI work up 2015 showed a small peptic ulcer and several small polyps, all of which were treated appropriately. 23. Recurrent atrial fibrillation documented mid October, 2017.  Patient underwent elective cardioversion, 11/03/2017 (Dr. Angie Ferris), but he reverted back to atrial fibrillation in 2 weeks.    24. Second opinion by Electrophysiology at [facility], 12/20/2018.  Recommended no further attempts at rhythm control and also recommended that amiodarone be stopped.      - BP today 126/64  - Atrial fibrillation at rate 63 on EKG  - Continue home BP monitoring (BP controlled)  - Continue current medications (including ARB and xarelto)  - Prior cardiac studies reviewed    Sita tSeele MD  [facility]) Cardiology

## 2021-07-20 ENCOUNTER — OFFICE VISIT (OUTPATIENT)
Dept: PRIMARY CARE CLINIC | Age: 81
End: 2021-07-20
Payer: MEDICARE

## 2021-07-20 VITALS
RESPIRATION RATE: 18 BRPM | HEART RATE: 72 BPM | OXYGEN SATURATION: 98 % | SYSTOLIC BLOOD PRESSURE: 138 MMHG | TEMPERATURE: 97.1 F | DIASTOLIC BLOOD PRESSURE: 80 MMHG | HEIGHT: 70 IN | BODY MASS INDEX: 29.35 KG/M2 | WEIGHT: 205 LBS

## 2021-07-20 VITALS
WEIGHT: 205 LBS | OXYGEN SATURATION: 98 % | TEMPERATURE: 97.1 F | SYSTOLIC BLOOD PRESSURE: 138 MMHG | HEIGHT: 70 IN | DIASTOLIC BLOOD PRESSURE: 80 MMHG | HEART RATE: 72 BPM | BODY MASS INDEX: 29.35 KG/M2 | RESPIRATION RATE: 18 BRPM

## 2021-07-20 DIAGNOSIS — I48.21 PERMANENT ATRIAL FIBRILLATION (HCC): ICD-10-CM

## 2021-07-20 DIAGNOSIS — I10 ESSENTIAL HYPERTENSION: ICD-10-CM

## 2021-07-20 DIAGNOSIS — E11.51 TYPE 2 DIABETES MELLITUS WITH DIABETIC PERIPHERAL ANGIOPATHY WITHOUT GANGRENE, WITHOUT LONG-TERM CURRENT USE OF INSULIN (HCC): Primary | ICD-10-CM

## 2021-07-20 DIAGNOSIS — Z00.00 ROUTINE GENERAL MEDICAL EXAMINATION AT A HEALTH CARE FACILITY: Primary | ICD-10-CM

## 2021-07-20 DIAGNOSIS — I70.209 ATHEROSCLEROTIC PERIPHERAL VASCULAR DISEASE (HCC): ICD-10-CM

## 2021-07-20 DIAGNOSIS — Z79.01 ANTICOAGULATED: ICD-10-CM

## 2021-07-20 DIAGNOSIS — Z12.5 SCREENING FOR PROSTATE CANCER: ICD-10-CM

## 2021-07-20 DIAGNOSIS — E78.2 MIXED HYPERLIPIDEMIA: ICD-10-CM

## 2021-07-20 DIAGNOSIS — I25.10 CORONARY ARTERY DISEASE INVOLVING NATIVE CORONARY ARTERY OF NATIVE HEART WITHOUT ANGINA PECTORIS: Chronic | ICD-10-CM

## 2021-07-20 PROBLEM — E11.59 TYPE 2 DIABETES MELLITUS WITH CIRCULATORY DISORDER, WITHOUT LONG-TERM CURRENT USE OF INSULIN (HCC): Status: ACTIVE | Noted: 2021-07-20

## 2021-07-20 PROBLEM — E11.9 TYPE 2 DIABETES MELLITUS WITHOUT COMPLICATION, WITHOUT LONG-TERM CURRENT USE OF INSULIN (HCC): Status: ACTIVE | Noted: 2021-07-20

## 2021-07-20 PROCEDURE — 99214 OFFICE O/P EST MOD 30 MIN: CPT | Performed by: INTERNAL MEDICINE

## 2021-07-20 PROCEDURE — G0439 PPPS, SUBSEQ VISIT: HCPCS | Performed by: INTERNAL MEDICINE

## 2021-07-20 SDOH — ECONOMIC STABILITY: FOOD INSECURITY: WITHIN THE PAST 12 MONTHS, YOU WORRIED THAT YOUR FOOD WOULD RUN OUT BEFORE YOU GOT MONEY TO BUY MORE.: NEVER TRUE

## 2021-07-20 SDOH — ECONOMIC STABILITY: FOOD INSECURITY: WITHIN THE PAST 12 MONTHS, THE FOOD YOU BOUGHT JUST DIDN'T LAST AND YOU DIDN'T HAVE MONEY TO GET MORE.: NEVER TRUE

## 2021-07-20 ASSESSMENT — LIFESTYLE VARIABLES
HOW MANY STANDARD DRINKS CONTAINING ALCOHOL DO YOU HAVE ON A TYPICAL DAY: 0
HAS A RELATIVE, FRIEND, DOCTOR, OR ANOTHER HEALTH PROFESSIONAL EXPRESSED CONCERN ABOUT YOUR DRINKING OR SUGGESTED YOU CUT DOWN: 0
HOW OFTEN DO YOU HAVE SIX OR MORE DRINKS ON ONE OCCASION: 0
HOW OFTEN DURING THE LAST YEAR HAVE YOU FOUND THAT YOU WERE NOT ABLE TO STOP DRINKING ONCE YOU HAD STARTED: 0
HOW OFTEN DURING THE LAST YEAR HAVE YOU FAILED TO DO WHAT WAS NORMALLY EXPECTED FROM YOU BECAUSE OF DRINKING: 0
AUDIT TOTAL SCORE: 3
HOW OFTEN DURING THE LAST YEAR HAVE YOU HAD A FEELING OF GUILT OR REMORSE AFTER DRINKING: 0
HOW OFTEN DURING THE LAST YEAR HAVE YOU BEEN UNABLE TO REMEMBER WHAT HAPPENED THE NIGHT BEFORE BECAUSE YOU HAD BEEN DRINKING: 0
HOW OFTEN DO YOU HAVE A DRINK CONTAINING ALCOHOL: 3
AUDIT-C TOTAL SCORE: 3
HAVE YOU OR SOMEONE ELSE BEEN INJURED AS A RESULT OF YOUR DRINKING: 0
HOW OFTEN DURING THE LAST YEAR HAVE YOU NEEDED AN ALCOHOLIC DRINK FIRST THING IN THE MORNING TO GET YOURSELF GOING AFTER A NIGHT OF HEAVY DRINKING: 0

## 2021-07-20 ASSESSMENT — PATIENT HEALTH QUESTIONNAIRE - PHQ9
SUM OF ALL RESPONSES TO PHQ9 QUESTIONS 1 & 2: 0
SUM OF ALL RESPONSES TO PHQ QUESTIONS 1-9: 0
1. LITTLE INTEREST OR PLEASURE IN DOING THINGS: 0
SUM OF ALL RESPONSES TO PHQ QUESTIONS 1-9: 0
SUM OF ALL RESPONSES TO PHQ QUESTIONS 1-9: 0
2. FEELING DOWN, DEPRESSED OR HOPELESS: 0

## 2021-07-20 ASSESSMENT — SOCIAL DETERMINANTS OF HEALTH (SDOH): HOW HARD IS IT FOR YOU TO PAY FOR THE VERY BASICS LIKE FOOD, HOUSING, MEDICAL CARE, AND HEATING?: NOT HARD AT ALL

## 2021-07-20 NOTE — PROGRESS NOTES
Medicare Annual Wellness Visit  Name: Tayler Streeter Date: 2021   MRN: [id number] Sex: Male   Age: 79 y.o. Ethnicity: Non-/Non    : 1940 Race: Amalia Correa is here for Medicare AWV (subsequent )    Screenings for behavioral, psychosocial and functional/safety risks, and cognitive dysfunction are all negative except as indicated below. These results, as well as other patient data from the [address] form, are documented in Flowsheets linked to this Encounter. Allergies   Allergen Reactions    Diltiazem Hcl      Bradycardia           Prior to Visit Medications    Medication Sig Taking? Authorizing Provider   rosuvastatin (CRESTOR) 5 MG tablet Take 1 tablet by mouth daily Yes Mylene Chapman MD   rivaroxaban (XARELTO) 20 MG TABS tablet Take 1 tablet by mouth daily (with breakfast) Indications: pt takes with dinner Yes Mylene Chapman MD   losartan (COZAAR) 25 MG tablet Take 1/2 tab daily Yes Mylene Chapman MD   celecoxib (CELEBREX) 100 MG capsule Take 100 mg by mouth as needed  Yes Historical Provider, MD   Cholecalciferol (VITAMIN D3) 2000 units CAPS Take 1 capsule by mouth daily Yes Historical Provider, MD   RESTASIS 0.05 % ophthalmic emulsion  Yes Historical Provider, MD   aspirin 81 MG chewable tablet Take 81 mg by mouth daily Yes Historical Provider, MD   LUMIGAN 0.01 % SOLN Place 1 drop into both eyes daily.  Yes Historical Provider, MD         Past Medical History:   Diagnosis Date    Atrial fibrillation ([address]     Atrial fibrillation with RVR ([facility] 2011    Basal cell cancer     resected from left leg    Current use of long term anticoagulation     Dyslipidemia     Gastric ulcer approx 2015    Hyperlipidemia     Hypertension     Myocardial infarct ([address] 1981    PVD (peripheral vascular disease) with claudication ([address] 2014    S/P femoral-popliteal bypass surgery 2014    Shoulder problem     Tachycardia     Type 2 diabetes mellitus without complication [facility])        Past Surgical History:   Procedure Laterality Date    ARTERIAL ANEURYSM REPAIR      popliteal artery - Dr. Beth Gil, 11/2002    CARDIOVERSION  10/19/2011    DCCV    CORONARY ANGIOPLASTY  7/26/1988, 9/19/1988    CORONARY ARTERY BYPASS GRAFT  11/26/2002    ECHO COMPL W DOP COLOR FLOW  9/24/2011         UPPER GASTROINTESTINAL ENDOSCOPY           Family History   Problem Relation Age of Onset    Cancer Mother         Esophageal CA    Cancer Father         Lung CA       CareTeam (Including outside providers/suppliers regularly involved in providing care):   Patient Care Team:  Rodger Hunt MD as PCP - General (Internal Medicine)  Rodger Hunt MD as PCP - [facility] Empaneled Provider  Crissy Velazco MD as Surgeon (Vascular Surgery)  Leslie Menard MD (Specialist)  Krista Dang MD as Surgeon (Colon and Rectal Surgery)  Sam Daley MD as Consulting Physician (Cardiology)  Florentino Hull MD as Surgeon (Orthopedic Surgery)    Wt Readings from Last 3 Encounters:   07/20/21 205 lb (93 kg)   07/20/21 205 lb (93 kg)   07/16/21 206 lb 1.6 oz (93.5 kg)     Vitals:    07/20/21 0753   BP: 138/80   Pulse: 72   Resp: 18   Temp: 97.1 °F (36.2 °C)   SpO2: 98%   Weight: 205 lb (93 kg)   Height: 5' 10\" (1.778 m)     Body mass index is 29.41 kg/m². Based upon direct observation of the patient, evaluation of cognition reveals recent and remote memory intact. Patient's complete Health Risk Assessment and screening values have been reviewed and are found in Flowsheets. The following problems were reviewed today and where indicated follow up appointments were made and/or referrals ordered. Positive Risk Factor Screenings with Interventions:            General Health and ACP:  General  In general, how would you say your health is?: Very Good  In the past 7 days, have you experienced any of the following?  New or Increased Pain, New or Increased Fatigue, Loneliness, Social Isolation, Stress or Anger?: None of These  Do you get the social and emotional support that you need?: Yes  Do you have a Living Will?: Yes  Advance Directives     Power of  Living Will ACP-Advance Directive ACP-Power of     Not on File Not on File Not on File Not on File      General Health Risk Interventions:  · No additional health risk interventions necessary at this time.      Hearing/Vision:  No exam data present  Hearing/Vision  Do you or your family notice any trouble with your hearing that hasn't been managed with hearing aids?: (!) Yes  Do you have difficulty driving, watching TV, or doing any of your daily activities because of your eyesight?: No  Have you had an eye exam within the past year?: Yes  Hearing/Vision Interventions:  · He is advised to get a hearing evaluation and an annual eye exam.    Safety:  Safety  Do you have working smoke detectors?: Yes  Have all throw rugs been removed or fastened?: Yes  Do you have non-slip mats or surfaces in all bathtubs/showers?: (!) No  Do all of your stairways have a railing or banister?: Yes  Are your doorways, halls and stairs free of clutter?: Yes  Do you always fasten your seatbelt when you are in a car?: Yes  Safety Interventions:  · Home safety tips provided     Personalized Preventive Plan   Current Health Maintenance Status  Immunization History   Administered Date(s) Administered    Influenza Virus Vaccine 09/24/2011, 10/29/2017    Pneumococcal Polysaccharide ([id number]) 09/24/2011        Health Maintenance   Topic Date Due    DTaP/Tdap/Td vaccine (1 - Tdap) Never done    Shingles Vaccine (2 of 3) 09/02/2016    Annual Wellness Visit (AWV)  Never done    Flu vaccine (1) 09/01/2021    PSA counseling  01/12/2022    Lipid screen  07/13/2022    Potassium monitoring  07/13/2022    Creatinine monitoring  07/13/2022    Pneumococcal 65+ years Vaccine  Completed    COVID-19 Vaccine Completed    Hepatitis A vaccine  Aged Out    Hib vaccine  Aged Out    Meningococcal (ACWY) vaccine  Aged Out     Recommendations for [email or web address] Due: see orders and patient instructions/AVS.  . Recommended screening schedule for the next 5-10 years is provided to the patient in written form: see Patient Instructions/AVS.    Elliot Lyman was seen today for medicare aw.     Diagnoses and all orders for this visit:    Routine general medical examination at a health care facility

## 2021-07-20 NOTE — PROGRESS NOTES
Ligia Pinzon  7/20/21     Chief Complaint   Patient presents with    Hypertension     6 month check up w labs         Allergies   Allergen Reactions    Diltiazem Hcl      Bradycardia          Current Outpatient Medications   Medication Sig Dispense Refill    rosuvastatin (CRESTOR) 5 MG tablet Take 1 tablet by mouth daily 90 tablet 3    rivaroxaban (XARELTO) 20 MG TABS tablet Take 1 tablet by mouth daily (with breakfast) Indications: pt takes with dinner 90 tablet 3    losartan (COZAAR) 25 MG tablet Take 1/2 tab daily 45 tablet 3    celecoxib (CELEBREX) 100 MG capsule Take 100 mg by mouth as needed       Cholecalciferol (VITAMIN D3) 2000 units CAPS Take 1 capsule by mouth daily      RESTASIS 0.05 % ophthalmic emulsion       aspirin 81 MG chewable tablet Take 81 mg by mouth daily      LUMIGAN 0.01 % SOLN Place 1 drop into both eyes daily. No current facility-administered medications for this visit. HPI: Patient comes in for 6-month follow-up visit and to review labs. Currently feels fairly well. He denies any chest pain or shortness of breath. He exercises on a regular basis. He tries to follow a heart healthy diet and limits his intake of refined carbohydrates. He follows up with his cardiologist and vascular surgeon on a regular basis as per their instructions. He remains on all of his usual meds and supplements the same as listed on his med list, which was reviewed with him. Review of Systems: as per HPI      Physical Exam:    Patient is a 79 y.o. male. Patient appears to be in no distress. Breathing comfortably. Ambulates without assistance. HEENT: normal.  Neck supple, no adenopathy or bruits. Heart irregularly irregular rhythm with an apical rate of 72 bpm.  No MGR. Lungs clear. Abd: normal  Ext: no edema. Peripheral pulses: normal.  No neurologic deficits noted.     Lab Results   Component Value Date    WBC 5.6 01/12/2021    HGB 15.0 01/12/2021    HCT 46.8 01/12/2021    PLT 188 01/12/2021    CHOL 143 07/13/2021    TRIG 100 07/13/2021    HDL 47 07/13/2021    ALT 33 07/13/2021    AST 24 07/13/2021    TSH 2.600 06/30/2020    PSA 1.01 01/12/2021    INR 1.5 10/11/2011    LABA1C 6.3 (H) 07/13/2021      Lab Results   Component Value Date     07/13/2021    K 4.4 07/13/2021     07/13/2021    CO2 26 07/13/2021    BUN 25 (H) 07/13/2021    CREATININE 1.1 07/13/2021    GLUCOSE 114 (H) 07/13/2021    CALCIUM 10.0 07/13/2021    PROT 7.8 07/13/2021    LABALBU 4.5 07/13/2021    BILITOT 0.6 07/13/2021    ALKPHOS 65 07/13/2021    AST 24 07/13/2021    ALT 33 07/13/2021    LABGLOM >60 07/13/2021    GFRAA >60 07/13/2021            Assessment:    Type 2 diabetes mellitus with diabetic peripheral angiopathy without gangrene, without long-term current use of insulin ([facility] [address] fair control with hemoglobin A1c of 6.3%. However that is up from 6.1% 6 months ago. -     Comprehensive Metabolic Panel; Future  -     Hemoglobin A1C; Future    Coronary artery disease involving native coronary artery of native heart without angina pectoris, stable and followed by his cardiologist.    Essential hypertension, fair control on current meds. -     CBC Auto Differential; Future  -     TSH without Reflex; Future    Permanent atrial fibrillation ([address] rate controlled on long-term anticoagulation with Xarelto. Atherosclerotic peripheral vascular disease ([facility] [address] [facility] currently stable and followed by his vascular surgeon annually. -     Lipid Panel; Future    Mixed hyperlipidemia, well controlled on rosuvastatin 5 mg daily. Screening for prostate cancer  -     PSA screening; Future    Anticoagulated with Xarelto, with no reported bleeding issues. Discussion Notes: He will continue all of his current meds and supplements the same as listed on his med list.  He is encouraged to try to follow a low-cholesterol, low-sodium, low refined carbohydrate, heart healthy diet and regular exercise.   He will follow-up with his cardiologist and vascular surgeon as per their instructions.   Return here as needed or in 6 months for his annual physical.

## 2021-07-20 NOTE — PATIENT INSTRUCTIONS
Personalized Preventive Plan for Alina Ac - 7/20/2021  Medicare offers a range of preventive health benefits. Some of the tests and screenings are paid in full while other may be subject to a deductible, co-insurance, and/or copay. Some of these benefits include a comprehensive review of your medical history including lifestyle, illnesses that may run in your family, and various assessments and screenings as appropriate. After reviewing your medical record and screening and assessments performed today your provider may have ordered immunizations, labs, imaging, and/or referrals for you. A list of these orders (if applicable) as well as your Preventive Care list are included within your After Visit Summary for your review. Other Preventive Recommendations:    · A preventive eye exam performed by an eye specialist is recommended every 1-2 years to screen for glaucoma; cataracts, macular degeneration, and other eye disorders. · A preventive dental visit is recommended every 6 months. · Try to get at least 150 minutes of exercise per week or 10,000 steps per day on a pedometer . · Order or download the FREE \"Exercise & Physical Activity: Your Everyday Guide\" from [employer]. Call 3-637.775.9872 or search [employer] online. · You need 3571-0825 mg of calcium and 3000-5556 IU of vitamin D per day. It is possible to meet your calcium requirement with diet alone, but a vitamin D supplement is usually necessary to meet this goal.  · When exposed to the sun, use a sunscreen that protects against both UVA and UVB radiation with an SPF of 30 or greater. Reapply every 2 to 3 hours or after sweating, drying off with a towel, or swimming. · Always wear a seat belt when traveling in a car. Always wear a helmet when riding a bicycle or motorcycle.

## 2021-08-15 ENCOUNTER — HOSPITAL ENCOUNTER (OUTPATIENT)
Age: 81
Setting detail: OBSERVATION
Discharge: HOME | End: 2021-08-16
Attending: STUDENT IN AN ORGANIZED HEALTH CARE EDUCATION/TRAINING PROGRAM
Payer: MEDICARE

## 2021-08-15 ENCOUNTER — APPOINTMENT (OUTPATIENT)
Dept: GENERAL RADIOLOGY | Age: 81
End: 2021-08-15
Payer: MEDICARE

## 2021-08-15 DIAGNOSIS — R07.9 CHEST PAIN, UNSPECIFIED TYPE: Primary | ICD-10-CM

## 2021-08-15 DIAGNOSIS — I48.20 CHRONIC ATRIAL FIBRILLATION (HCC): ICD-10-CM

## 2021-08-15 PROBLEM — I10 UNCONTROLLED HYPERTENSION: Status: ACTIVE | Noted: 2021-08-15

## 2021-08-15 LAB
ALBUMIN SERPL-MCNC: 4.3 G/DL (ref 3.5–5.2)
ALP BLD-CCNC: 67 U/L (ref 40–129)
ALT SERPL-CCNC: 38 U/L (ref 0–40)
ANION GAP SERPL CALCULATED.3IONS-SCNC: 13 MMOL/L (ref 7–16)
AST SERPL-CCNC: 27 U/L (ref 0–39)
BASOPHILS ABSOLUTE: 0.03 E9/L (ref 0–0.2)
BASOPHILS RELATIVE PERCENT: 0.4 % (ref 0–2)
BILIRUB SERPL-MCNC: 0.7 MG/DL (ref 0–1.2)
BUN BLDV-MCNC: 30 MG/DL (ref 6–23)
CALCIUM SERPL-MCNC: 9.8 MG/DL (ref 8.6–10.2)
CHLORIDE BLD-SCNC: 103 MMOL/L (ref 98–107)
CO2: 22 MMOL/L (ref 22–29)
CREAT SERPL-MCNC: 1 MG/DL (ref 0.7–1.2)
EKG ATRIAL RATE: 102 BPM
EKG Q-T INTERVAL: 390 MS
EKG QRS DURATION: 112 MS
EKG QTC CALCULATION (BAZETT): 423 MS
EKG R AXIS: 13 DEGREES
EKG T AXIS: 37 DEGREES
EKG VENTRICULAR RATE: 71 BPM
EOSINOPHILS ABSOLUTE: 0.13 E9/L (ref 0.05–0.5)
EOSINOPHILS RELATIVE PERCENT: 1.8 % (ref 0–6)
GFR AFRICAN AMERICAN: >60
GFR NON-AFRICAN AMERICAN: >60 ML/MIN/1.73
GLUCOSE BLD-MCNC: 130 MG/DL (ref 74–99)
HCT VFR BLD CALC: 47.5 % (ref 37–54)
HEMOGLOBIN: 16 G/DL (ref 12.5–16.5)
IMMATURE GRANULOCYTES #: 0.03 E9/L
IMMATURE GRANULOCYTES %: 0.4 % (ref 0–5)
LYMPHOCYTES ABSOLUTE: 1.62 E9/L (ref 1.5–4)
LYMPHOCYTES RELATIVE PERCENT: 22.2 % (ref 20–42)
MCH RBC QN AUTO: 33.5 PG (ref 26–35)
MCHC RBC AUTO-ENTMCNC: 33.7 % (ref 32–34.5)
MCV RBC AUTO: 99.6 FL (ref 80–99.9)
MONOCYTES ABSOLUTE: 0.54 E9/L (ref 0.1–0.95)
MONOCYTES RELATIVE PERCENT: 7.4 % (ref 2–12)
NEUTROPHILS ABSOLUTE: 4.96 E9/L (ref 1.8–7.3)
NEUTROPHILS RELATIVE PERCENT: 67.8 % (ref 43–80)
PDW BLD-RTO: 13.2 FL (ref 11.5–15)
PLATELET # BLD: 203 E9/L (ref 130–450)
PMV BLD AUTO: 11.6 FL (ref 7–12)
POTASSIUM REFLEX MAGNESIUM: 4.5 MMOL/L (ref 3.5–5)
RBC # BLD: 4.77 E12/L (ref 3.8–5.8)
REASON FOR REJECTION: NORMAL
REJECTED TEST: NORMAL
SODIUM BLD-SCNC: 138 MMOL/L (ref 132–146)
TOTAL PROTEIN: 7.3 G/DL (ref 6.4–8.3)
TROPONIN, HIGH SENSITIVITY: 10 NG/L (ref 0–11)
TROPONIN, HIGH SENSITIVITY: 12 NG/L (ref 0–11)
WBC # BLD: 7.3 E9/L (ref 4.5–11.5)

## 2021-08-15 PROCEDURE — 2580000003 HC RX 258: Performed by: NURSE PRACTITIONER

## 2021-08-15 PROCEDURE — 93005 ELECTROCARDIOGRAM TRACING: CPT | Performed by: PHYSICIAN ASSISTANT

## 2021-08-15 PROCEDURE — G0378 HOSPITAL OBSERVATION PER HR: HCPCS

## 2021-08-15 PROCEDURE — 80053 COMPREHEN METABOLIC PANEL: CPT

## 2021-08-15 PROCEDURE — 99284 EMERGENCY DEPT VISIT MOD MDM: CPT

## 2021-08-15 PROCEDURE — 6370000000 HC RX 637 (ALT 250 FOR IP): Performed by: NURSE PRACTITIONER

## 2021-08-15 PROCEDURE — 36415 COLL VENOUS BLD VENIPUNCTURE: CPT

## 2021-08-15 PROCEDURE — 6370000000 HC RX 637 (ALT 250 FOR IP): Performed by: STUDENT IN AN ORGANIZED HEALTH CARE EDUCATION/TRAINING PROGRAM

## 2021-08-15 PROCEDURE — 85025 COMPLETE CBC W/AUTO DIFF WBC: CPT

## 2021-08-15 PROCEDURE — 71046 X-RAY EXAM CHEST 2 VIEWS: CPT

## 2021-08-15 PROCEDURE — 84484 ASSAY OF TROPONIN QUANT: CPT

## 2021-08-15 PROCEDURE — 93010 ELECTROCARDIOGRAM REPORT: CPT | Performed by: INTERNAL MEDICINE

## 2021-08-15 RX ORDER — ASPIRIN 81 MG/1
81 TABLET ORAL DAILY
Status: DISCONTINUED | OUTPATIENT
Start: 2021-08-15 | End: 2021-08-16 | Stop reason: HOSPADM

## 2021-08-15 RX ORDER — ZINC GLUCONATE 50 MG
50 TABLET ORAL DAILY
COMMUNITY
End: 2021-09-01

## 2021-08-15 RX ORDER — SODIUM CHLORIDE 0.9 % (FLUSH) 0.9 %
5-40 SYRINGE (ML) INJECTION PRN
Status: DISCONTINUED | OUTPATIENT
Start: 2021-08-15 | End: 2021-08-16 | Stop reason: HOSPADM

## 2021-08-15 RX ORDER — ACETAMINOPHEN 325 MG/1
650 TABLET ORAL EVERY 6 HOURS PRN
Status: DISCONTINUED | OUTPATIENT
Start: 2021-08-15 | End: 2021-08-16 | Stop reason: HOSPADM

## 2021-08-15 RX ORDER — LOSARTAN POTASSIUM 25 MG/1
25 TABLET ORAL DAILY
Status: DISCONTINUED | OUTPATIENT
Start: 2021-08-16 | End: 2021-08-16 | Stop reason: HOSPADM

## 2021-08-15 RX ORDER — IBUPROFEN 200 MG
200 TABLET ORAL EVERY 6 HOURS PRN
COMMUNITY

## 2021-08-15 RX ORDER — ROSUVASTATIN CALCIUM 5 MG/1
5 TABLET, COATED ORAL NIGHTLY
Status: DISCONTINUED | OUTPATIENT
Start: 2021-08-15 | End: 2021-08-16 | Stop reason: HOSPADM

## 2021-08-15 RX ORDER — SODIUM CHLORIDE 9 MG/ML
25 INJECTION, SOLUTION INTRAVENOUS PRN
Status: DISCONTINUED | OUTPATIENT
Start: 2021-08-15 | End: 2021-08-16 | Stop reason: HOSPADM

## 2021-08-15 RX ORDER — ACETAMINOPHEN 650 MG/1
650 SUPPOSITORY RECTAL EVERY 6 HOURS PRN
Status: DISCONTINUED | OUTPATIENT
Start: 2021-08-15 | End: 2021-08-16 | Stop reason: HOSPADM

## 2021-08-15 RX ORDER — ASPIRIN 81 MG/1
243 TABLET, CHEWABLE ORAL ONCE
Status: COMPLETED | OUTPATIENT
Start: 2021-08-15 | End: 2021-08-15

## 2021-08-15 RX ORDER — ONDANSETRON 2 MG/ML
4 INJECTION INTRAMUSCULAR; INTRAVENOUS EVERY 6 HOURS PRN
Status: DISCONTINUED | OUTPATIENT
Start: 2021-08-15 | End: 2021-08-16 | Stop reason: HOSPADM

## 2021-08-15 RX ORDER — POLYETHYLENE GLYCOL 3350 17 G/17G
17 POWDER, FOR SOLUTION ORAL DAILY PRN
Status: DISCONTINUED | OUTPATIENT
Start: 2021-08-15 | End: 2021-08-16 | Stop reason: HOSPADM

## 2021-08-15 RX ORDER — ONDANSETRON 4 MG/1
4 TABLET, ORALLY DISINTEGRATING ORAL EVERY 8 HOURS PRN
Status: DISCONTINUED | OUTPATIENT
Start: 2021-08-15 | End: 2021-08-16 | Stop reason: HOSPADM

## 2021-08-15 RX ORDER — SODIUM CHLORIDE 0.9 % (FLUSH) 0.9 %
5-40 SYRINGE (ML) INJECTION EVERY 12 HOURS SCHEDULED
Status: DISCONTINUED | OUTPATIENT
Start: 2021-08-15 | End: 2021-08-16 | Stop reason: HOSPADM

## 2021-08-15 RX ORDER — HYDRALAZINE HYDROCHLORIDE 25 MG/1
25 TABLET, FILM COATED ORAL EVERY 8 HOURS SCHEDULED
Status: DISCONTINUED | OUTPATIENT
Start: 2021-08-15 | End: 2021-08-16 | Stop reason: HOSPADM

## 2021-08-15 RX ADMIN — RIVAROXABAN 20 MG: 20 TABLET, FILM COATED ORAL at 18:26

## 2021-08-15 RX ADMIN — HYDRALAZINE HYDROCHLORIDE 25 MG: 25 TABLET, FILM COATED ORAL at 22:45

## 2021-08-15 RX ADMIN — ASPIRIN 243 MG: 81 TABLET, CHEWABLE ORAL at 15:42

## 2021-08-15 RX ADMIN — SODIUM CHLORIDE, PRESERVATIVE FREE 10 ML: 5 INJECTION INTRAVENOUS at 20:29

## 2021-08-15 RX ADMIN — ROSUVASTATIN CALCIUM 5 MG: 5 TABLET, FILM COATED ORAL at 20:28

## 2021-08-15 ASSESSMENT — ENCOUNTER SYMPTOMS
SHORTNESS OF BREATH: 0
WHEEZING: 0
EYE DISCHARGE: 0
SINUS PRESSURE: 0
COUGH: 0
EYE PAIN: 0
EYE REDNESS: 0
SORE THROAT: 0
NAUSEA: 0
BACK PAIN: 0
VOMITING: 0
DIARRHEA: 0
ABDOMINAL PAIN: 0

## 2021-08-15 ASSESSMENT — PAIN SCALES - GENERAL
PAINLEVEL_OUTOF10: 0
PAINLEVEL_OUTOF10: 0

## 2021-08-15 NOTE — H&P
Hospitalist History & Physical      PCP: Valencia Villavicencio MD    Date of Admission: 8/15/2021    Date of Service: Pt seen/examined on 8/15/2021 and is Placed in Observation. Chief Complaint:  had concerns including Hypertension (patient states high bp at home and was having palpitations yesterday and a \"little bit\" this morning). History Of Present Illness:    Mr. Kiah Eldridge is a 79y.o. year old male  who  has a past medical history of Atrial fibrillation ([address] Atrial fibrillation with RVR ([address] Basal cell cancer, Current use of long term anticoagulation, Dyslipidemia, Gastric ulcer, Hyperlipidemia, Hypertension, Myocardial infarct ([address] PVD (peripheral vascular disease) with claudication ([address] S/P femoral-popliteal bypass surgery, Shoulder problem, Tachycardia, and Type 2 diabetes mellitus without complication ([address] He presented to the ER on 8/15/21 with complaints of HTN. He states that his BP was reading high at home (144/102) and that he was overall not feeling well. He endorsed complaints of palpitations and indigestion yesterday evening but denies complaints of chest pain or SOB. VS on arrival with /94, HR 90. Labs were unremarkable. CXR negative. EKG with Afib rate 71, nonischemic.     Past Medical History:        Diagnosis Date    Atrial fibrillation ([address]     Atrial fibrillation with RVR ([address] 9/23/2011    Basal cell cancer     resected from left leg    Current use of long term anticoagulation     Dyslipidemia     Gastric ulcer approx 2015    Hyperlipidemia     Hypertension     Myocardial infarct ([address] 1981    PVD (peripheral vascular disease) with claudication ([address] 1/9/2014    S/P femoral-popliteal bypass surgery 1/9/2014    Shoulder problem     Tachycardia     Type 2 diabetes mellitus without complication [facility])        Past Surgical History:        Procedure Laterality Date    ARTERIAL ANEURYSM REPAIR      popliteal artery - Dr. Karlos Bustamante CATHETERIZATION  1988, 11/2002    CARDIOVERSION  10/19/2011    DCCV    CORONARY ANGIOPLASTY  7/26/1988, 9/19/1988    CORONARY ARTERY BYPASS GRAFT  11/26/2002    ECHO COMPL W DOP COLOR FLOW  9/24/2011         UPPER GASTROINTESTINAL ENDOSCOPY         Medications Prior to Admission:      Prior to Admission medications    Medication Sig Start Date End Date Taking? Authorizing Provider   ibuprofen (ADVIL;MOTRIN) 200 MG tablet Take 200 mg by mouth every 6 hours as needed for Pain   Yes Historical Provider, MD   zinc gluconate 50 MG tablet Take 50 mg by mouth daily   Yes Historical Provider, MD   rosuvastatin (CRESTOR) 5 MG tablet Take 1 tablet by mouth daily 4/12/21  Yes Sarah Jacob MD   rivaroxaban (XARELTO) 20 MG TABS tablet Take 1 tablet by mouth daily (with breakfast) Indications: pt takes with dinner 4/12/21  Yes Sarah Jacob MD   losartan (COZAAR) 25 MG tablet Take 1/2 tab daily 1/20/21  Yes Sarah Jacob MD   celecoxib (CELEBREX) 100 MG capsule Take 100 mg by mouth as needed    Yes Historical Provider, MD   Cholecalciferol (VITAMIN D3) 2000 units CAPS Take 1 capsule by mouth daily   Yes Historical Provider, MD   aspirin 81 MG EC tablet Take 81 mg by mouth daily    Yes Historical Provider, MD   LUMIGAN 0.01 % SOLN Place 1 drop into both eyes daily. 9/12/13  Yes Historical Provider, MD       Allergies:  Diltiazem hcl    Social History:    RESIDENCE: Private residence  TOBACCO:   reports that he quit smoking about 40 years ago. His smoking use included cigarettes. He has a 40.00 pack-year smoking history. He has never used smokeless tobacco.  ETOH:   reports current alcohol use. Family History:    As follows:      Problem Relation Age of Onset    Cancer Mother         Esophageal CA    Cancer Father         Lung CA       REVIEW OF SYSTEMS:   Pertinent positives as noted in the HPI. All other systems reviewed and negative.     PHYSICAL EXAM:  BP (!) 149/108   Pulse 70   Temp 97.3 °F (36.3 °C) (Temporal)   Resp 18   Ht 5' 11\" (1.803 m)   Wt 202 lb (91.6 kg)   SpO2 97%   BMI 28.17 kg/m²   General appearance: No apparent distress, appears stated age and cooperative. HEENT: Normal cephalic, atraumatic without obvious deformity. Pupils equal, round, and reactive to light. Neck: Supple, with full range of motion. No jugular venous distention. Trachea midline. Respiratory:  Clear to auscultation bilaterally. No apparent distress. Cardiovascular:  IRR- afib on cardiac monitor rate 80. S1, S2 without murmurs, rubs, or gallops. PV: Brisk capillary refill. +2 pedal and radial pulses bilaterally. No clubbing, cyanosis, edema of bilateral lower extremities. Abdomen: Soft, non-tender, non-distended. +BS  Musculoskeletal: No obvious deformities or erythematous or edematous joints. Skin: Normal skin color. No rashes or lesions. Neurologic:  Neurovascularly intact without any focal sensory/motor deficits. Psychiatric: Calm and cooperative    Reviewed EKG and CXR personally      CBC:   Recent Labs     08/15/21  1100   WBC 7.3   RBC 4.77   HGB 16.0   HCT 47.5   MCV 99.6   RDW 13.2        BMP:   Recent Labs     08/15/21  1100      K 4.5      CO2 22   BUN 30*   CREATININE 1.0     LFT:  Recent Labs     08/15/21  1100   PROT 7.3   ALKPHOS 67   ALT 38   AST 27   BILITOT 0.7     CE:  No results for input(s): Kike Mercado in the last 72 hours. PT/INR: No results for input(s): INR, APTT in the last 72 hours. BNP: No results for input(s): BNP in the last 72 hours.   ESR: No results found for: SEDRATE  CRP: No results found for: CRP  D Dimer: No results found for: DDIMER   Folate and B12: No results found for: PVYCEENC22, No results found for: FOLATE  Lactic Acid: No results found for: LACTA  Thyroid Studies:   Lab Results   Component Value Date    TSH 2.600 06/30/2020       Oupatient labs:  Lab Results   Component Value Date    CHOL 143 07/13/2021    TRIG 100 07/13/2021    HDL 47 07/13/2021    LDLCALC 76 07/13/2021    TSH 2.600 06/30/2020    PSA 1.01 01/12/2021    INR 1.5 10/11/2011    LABA1C 6.3 (H) 07/13/2021       Urinalysis:    Lab Results   Component Value Date    NITRU NEGATIVE 09/23/2011    BLOODU trace 01/19/2021    BLOODU NEGATIVE 09/23/2011    SPECGRAV 1.030 01/19/2021    SPECGRAV <=1.005 09/23/2011    GLUCOSEU neg 01/19/2021    GLUCOSEU NEGATIVE 09/23/2011       Imaging:  XR CHEST (2 VW)    Result Date: 8/15/2021  No pneumonia or pleural effusion. ASSESSMENT/PLAN:    Uncontrolled HTN- takes 12.5 mg of losartan daily, states that his BP is generally 284-194 systolic. BP consistently 404-612 mmHg systolic in the ED. He denies chest pain or SOB. Per cardiology note he was previously intolerant of betablockers and cardizem in the past. Will increase losartan to 25 mg and add hydralazine 25 mg PO tid. Atrial fibrillation- anticoagulated with xarelto. Rate controlled. HLD- continue statin    CAD s/p CABG x 4 (2001)        Diet: No diet orders on file  Code Status: Prior  Surrogate decision maker confirmed with patient:   Extended Emergency Contact Information  Primary Emergency Contact: Erla Homans  Address: [address] [facility] Phone: [phone]  Mobile Phone: [phone]  Relation: Spouse    DVT Prophylaxis: []Lovenox []Heparin []PCD [x] [address] []Encouraged ambulation  Disposition: []Med/Surg [x] Intermediate [] ICU/CCU  Admit status: [x] Observation [] Inpatient     +++++++++++++++++++++++++++++++++++++++++++++++++  [address], [place]  +++++++++++++++++++++++++++++++++++++++++++++++++  NOTE: This report was transcribed using voice recognition software. Every effort was made to ensure accuracy; however, inadvertent computerized transcription errors may be present.

## 2021-08-15 NOTE — DISCHARGE INSTR - COC
Continuity of Care Form    Patient Name: Candelaria Duffy   :  1940  MRN:  [id number]    Admit date:  8/15/2021  Discharge date:  ***    Code Status Order: Prior   Advance Directives:     Admitting Physician:  No admitting provider for patient encounter.   PCP: Syeda Leong MD    Discharging Nurse: [facility] Unit/Room#: 18B/18B-18  Discharging Unit Phone Number: ***    Emergency Contact:   Extended Emergency Contact Information  Primary Emergency Contact: Elan Tilley  Address: [address] [place] [address] [address] [place], [address] [place] [address] Phone: [phone]  Mobile Phone: [phone]  Relation: Spouse    Past Surgical History:  Past Surgical History:   Procedure Laterality Date    ARTERIAL ANEURYSM REPAIR      popliteal artery - Dr. Katie Chang, 2002    CARDIOVERSION  10/19/2011    DCCV    CORONARY ANGIOPLASTY  1988, 1988    CORONARY ARTERY BYPASS GRAFT  2002    ECHO COMPL W DOP COLOR FLOW  2011         UPPER GASTROINTESTINAL ENDOSCOPY         Immunization History:   Immunization History   Administered Date(s) Administered    COVID-19, Moderna, PF, 100mcg/0.5mL 2021, 2021    Influenza Virus Vaccine 2011, 10/29/2017    Pneumococcal Polysaccharide (Lqaestkgp92) 2011       Active Problems:  Patient Active Problem List   Diagnosis Code    Coronary artery disease I25.10    Hyperlipidemia E78.5    Allergic rhinitis J30.9    Permanent atrial fibrillation ([address] I48.21    Aneurysm of abdominal aorta (HCC) I71.4    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    S/P femoral-popliteal bypass surgery Z95.828    Essential hypertension I10    Atherosclerotic peripheral vascular disease ([address] I70.209    Type 2 diabetes mellitus with circulatory disorder, without long-term current use of insulin ([address] E11.59    Anticoagulated Z79.01       Isolation/Infection:   Isolation          No Isolation Patient Infection Status     None to display          Nurse Assessment:  Last Vital Signs: BP (!) 144/94   Pulse 96   Temp 97.3 °F (36.3 °C) (Temporal)   Resp 18   Ht 5' 11\" (1.803 m)   Wt 202 lb (91.6 kg)   SpO2 98%   BMI 28.17 kg/m²     Last documented pain score (0-10 scale):    Last Weight:   Wt Readings from Last 1 Encounters:   08/15/21 202 lb (91.6 kg)     Mental Status:  {IP PT MENTAL STATUS:}    IV Access:  { AR IV ACCESS:330453798}    Nursing Mobility/ADLs:  Walking   {P DME YAHH:253790091}  Transfer  {P DME ZHW}  Bathing  {P DME EFE}  Dressing  {P DME KVYW:713275005}  Toileting  {P DME DMPN:783609465}  Feeding  {[facility] DME ZLWB:134221678}  Med Admin  {[facility] DME ZWKA:840683826}  Med Delivery   {[facility] MED Delivery:708296552}    Wound Care Documentation and Therapy:        Elimination:  Continence:   · Bowel: {YES / TO:28427}  · Bladder: {YES / VE:12567}  Urinary Catheter: {Urinary Catheter:056613872}   Colostomy/Ileostomy/Ileal Conduit: {YES / XW:41740}       Date of Last BM: ***  No intake or output data in the 24 hours ending 08/15/21 1346  No intake/output data recorded.     Safety Concerns:     [address] Safety Concerns:883196493}    Impairments/Disabilities:      [address] Impairments/Disabilities:353815813}    Nutrition Therapy:  Current Nutrition Therapy:   [address] Diet List:571850787}    Routes of Feeding: {[facility] DME Other Feedings:229758048}  Liquids: {Slp liquid thickness:60850}  Daily Fluid Restriction: {CHP DME Yes amt example:219006484}  Last Modified Barium Swallow with Video (Video Swallowing Test): {Done Not Done QFQZ:152415673}    Treatments at the Time of Hospital Discharge:   Respiratory Treatments: ***  Oxygen Therapy:  {Therapy; copd oxygen:35828}  Ventilator:    {MH CC Vent IHSL:797952015}    Rehab Therapies: {THERAPEUTIC INTERVENTION:8833104593}  Weight Bearing Status/Restrictions: {OJ MIRANDA Weight Bearin}  Other Medical Equipment (for information only, NOT a DME order):  {EQUIPMENT:639461378}  Other Treatments: ***    Patient's personal belongings (please select all that are sent with patient):  {CHP DME Belongings:416704690}    RN SIGNATURE:  {Esignature:482415310}    CASE MANAGEMENT/SOCIAL WORK SECTION    Inpatient Status Date: ***    Readmission Risk Assessment Score:  Readmission Risk              Risk of Unplanned Readmission:  0           Discharging to Facility/ Agency   · Name:   · Address:  · Phone:  · Fax:    Dialysis Facility (if applicable)   · Name:  · Address:  · Dialysis Schedule:  · Phone:  · Fax:    / signature: {Esignature:679222863}    PHYSICIAN SECTION    Prognosis: {Prognosis:7042716620}    Condition at Discharge: [address] [place] [address] Patient Condition:763125338}    Rehab Potential (if transferring to Rehab): {Prognosis:4372150619}    Recommended Labs or Other Treatments After Discharge: ***    Physician Certification: I certify the above information and transfer of Payam Jauregui  is necessary for the continuing treatment of the diagnosis listed and that he requires {Admit to Appropriate Level of Care:13591} for {GREATER/LESS:416229183} 30 days.      Update Admission H&P: {CHP DME Changes in ZYFCL:457151680}    PHYSICIAN SIGNATURE:  {Esignature:096806305}

## 2021-08-15 NOTE — ED PROVIDER NOTES
ATTENDING PROVIDER ATTESTATION:     Nayeli Law presented to the emergency department for evaluation of Hypertension (patient states high bp at home and was having palpitations yesterday and a \"little bit\" this morning)   and was initially evaluated by the Medical Resident. See Original ED Note for H&P and ED course above. I have reviewed and discussed the case, including pertinent history (medical, surgical, family and social) and exam findings with the Medical Resident assigned to Nayeli Law. I have personally performed and/or participated in the history, exam, medical decision making, and procedures and agree with all pertinent clinical information and any additional changes or corrections are noted below in my assessment and plan. I have discussed this patient in detail with the resident, and provided the instruction and education,     I have reviewed my findings and recommendations with the assigned Medical Resident, Nayeli Law and members of family present at the time of disposition. I have performed a history and physical examination of this patient and directly supervised the resident caring for this patient    HPI  This is an 56-year-old male with past medical history of atrial fibrillation compliant on Xarelto who presents to the emergency department today with chief complaint of palpitations. Patient states he has been having these symptoms for the past 2 days. He has felt palpitations and a heaviness in his chest that radiates to his neck. Started again this morning. Is also felt his blood pressure has been higher than normal.  He takes losartan but has been decreased to half a tab because he was having decreased blood pressures. The patient denies any shortness of breath. No fevers or cough. No nausea or vomiting. He does feel overall fatigued but denies any headache, vision changes, numbness, tingling or weakness.     ROS  A complete review of systems was performed and pertinent positives and negatives are stated within HPI, all other systems reviewed and are negative.    --------------------------------------------- PAST HISTORY ---------------------------------------------  Past Medical History:  has a past medical history of Atrial fibrillation ([facility] Atrial fibrillation with RVR ([facility] Basal cell cancer, Current use of long term anticoagulation, Dyslipidemia, Gastric ulcer, Hyperlipidemia, Hypertension, Myocardial infarct ([facility] PVD (peripheral vascular disease) with claudication ([facility] S/P femoral-popliteal bypass surgery, Shoulder problem, Tachycardia, and Type 2 diabetes mellitus without complication ([facility] Past Surgical History:  has a past surgical history that includes Arterial aneurysm repair (); ECHO Compl W Dop Color Flow (9/24/2011); Cardiac catheterization (1988, 11/2002); Coronary angioplasty (7/26/1988, 9/19/1988); Coronary artery bypass graft (11/26/2002); Cardioversion (10/19/2011); and Upper gastrointestinal endoscopy. Social History:  reports that he quit smoking about 40 years ago. His smoking use included cigarettes. He has a 40.00 pack-year smoking history. He has never used smokeless tobacco. He reports current alcohol use. He reports that he does not use drugs. Family History: family history includes Cancer in his father and mother. Unless otherwise noted, family history is non contributory    The patients home medications have been reviewed. Allergies: Diltiazem hcl    Physical Exam  General: The patient is conversational and lying comfortably in bed. Head: Normocephalic and atraumatic. Eyes: Sclera non-icteric and no conjunctival injection  ENT: Nasal and oral membranes moist  Neck: Trachea midline. No JVD  Respiratory: Lungs clear to auscultation bilaterally. Patient speaking in full sentences. Cardiovascular: Irregularly irregular. No pedal edema. Gastrointestinal:  Abdomen is soft and nondistended. Bowel sounds present.  There is no tenderness. There is no guarding or rebound. Musculoskeletal: No deformity  Skin: Skin warm and dry. No rashes. Neurologic: No gross motor deficits. No aphasia. Psychiatric: Normal affect. MDM  This is a 79 y.o. male presenting to the ED for palpitations. On initial presentation, the patient's vital signs are interpreted as mildly hypertensive, afebrile and saturating well on room air. Based on history and physical exam, we have a broad differential.  The patient has known atrial fibrillation. We considered worsening arrhythmia, ACS, pneumonia. For this and will obtain chest x-ray, EKG and laboratory studies. The patient endorses compliance with his Xarelto we are not concerned for PE at this time. A 12-lead EKG was performed and interpreted by myself. The rate is 71 with irregularly irregular and normal axis. The QRS interval is 112, and QTC interval is 423. The patient has T wave inversions in V1. No acute ST elevation or depression. There is good R wave progression. There is premature ventricular complexes. This is atrial fibrillation with PVCs and nonspecific abnormality. Laboratory studies show mildly elevated bicarb. The patient's electrolytes otherwise are within normal limits. Troponin is 12. This will be repeated. LFTs within normal limits. No leukocytosis or anemia. Chest x-ray shows no pneumonia or pleural effusion. This is interpreted by radiology. Patient's repeat troponin is 10. On reevaluation he continues to have palpitations. He has not had cardia provocative testing for the past 3 years. We do feel he requires further cardiac work-up for his symptoms. Resident spoke with his admitting team and they are agreeable. He will be admitted to telemetry bed. Patient verbalies understanding and is agreeable to the plan.     I directly supervised any procedures performed by the resident and was present for the procedure including all critical portions of the procedure    The cardiac monitor revealed atrial fibrillation with a heart rate in the 70s as interpreted by me. The cardiac monitor was ordered secondary to the patient's palpiltations and to monitor the patient for dysrhythmia. CPT [id number]    I, Dr. Juan Ahmadi, am the primary provider of record    Impression  1. Chest pain, unspecified type    2.  Chronic atrial fibrillation [facility])            Juan Ahmadi MD  08/15/21 2022

## 2021-08-15 NOTE — ED PROVIDER NOTES
systems reviewed and are negative. Physical Exam  Vitals and nursing note reviewed. Constitutional:       Appearance: He is well-developed. HENT:      Head: Normocephalic and atraumatic. Eyes:      Conjunctiva/sclera: Conjunctivae normal.   Cardiovascular:      Rate and Rhythm: Normal rate and regular rhythm. Heart sounds: Normal heart sounds. No murmur heard. Pulmonary:      Effort: Pulmonary effort is normal. No respiratory distress. Breath sounds: Normal breath sounds. No wheezing or rales. Abdominal:      General: Bowel sounds are normal.      Palpations: Abdomen is soft. Tenderness: There is no abdominal tenderness. There is no guarding or rebound. Musculoskeletal:         General: No tenderness or deformity. Cervical back: Normal range of motion and neck supple. Skin:     General: Skin is warm and dry. Neurological:      Mental Status: He is alert and oriented to person, place, and time. Cranial Nerves: No cranial nerve deficit. Coordination: Coordination normal.          Procedures       MDM  Number of Diagnoses or Management Options  Chest pain, unspecified type  Diagnosis management comments: Patient is an 79-year-old male past medical history of A. fib anticoagulated on Xarelto, hyperlipidemia, hypertension type 2 diabetes. Patient presents chief complaint of chest pain elevated blood pressure. BP mildly elevated on arrival 160/103, remainder vital signs stable. On physical exam heart regular rate with irregular rhythm, lungs clear to oscillation, abdomen soft nontender. EKG obtained demonstrates A. fib with PVCs no acute ischemic changes. Labs are obtained CBC unremarkable, CMP unremarkable, troponin initially was 12 on repeat 10. Chest x-ray obtained demonstrated no acute antibiotics. Due to patient's elevated heart score 5 decision made to admit patient. Findings consistent with chest pain. Patient given aspirin, decision care home patient. hcl    -------------------------------------------------- RESULTS -------------------------------------------------    LABS:  Results for orders placed or performed during the hospital encounter of 08/15/21   CBC Auto Differential   Result Value Ref Range    WBC 7.3 4.5 - 11.5 E9/L    RBC 4.77 3.80 - 5.80 E12/L    Hemoglobin 16.0 12.5 - 16.5 g/dL    Hematocrit 47.5 37.0 - 54.0 %    MCV 99.6 80.0 - 99.9 fL    MCH 33.5 26.0 - 35.0 pg    MCHC 33.7 32.0 - 34.5 %    RDW 13.2 11.5 - 15.0 fL    Platelets 052 889 - 032 E9/L    MPV 11.6 7.0 - 12.0 fL    Neutrophils % 67.8 43.0 - 80.0 %    Immature Granulocytes % 0.4 0.0 - 5.0 %    Lymphocytes % 22.2 20.0 - 42.0 %    Monocytes % 7.4 2.0 - 12.0 %    Eosinophils % 1.8 0.0 - 6.0 %    Basophils % 0.4 0.0 - 2.0 %    Neutrophils Absolute 4.96 1.80 - 7.30 E9/L    Immature Granulocytes # 0.03 E9/L    Lymphocytes Absolute 1.62 1.50 - 4.00 E9/L    Monocytes Absolute 0.54 0.10 - 0.95 E9/L    Eosinophils Absolute 0.13 0.05 - 0.50 E9/L    Basophils Absolute 0.03 0.00 - 0.20 E9/L   Comprehensive Metabolic Panel w/ Reflex to MG   Result Value Ref Range    Sodium 138 132 - 146 mmol/L    Potassium reflex Magnesium 4.5 3.5 - 5.0 mmol/L    Chloride 103 98 - 107 mmol/L    CO2 22 22 - 29 mmol/L    Anion Gap 13 7 - 16 mmol/L    Glucose 130 (H) 74 - 99 mg/dL    BUN 30 (H) 6 - 23 mg/dL    CREATININE 1.0 0.7 - 1.2 mg/dL    GFR Non-African American >60 >=60 mL/min/1.73    GFR African American >60     Calcium 9.8 8.6 - 10.2 mg/dL    Total Protein 7.3 6.4 - 8.3 g/dL    Albumin 4.3 3.5 - 5.2 g/dL    Total Bilirubin 0.7 0.0 - 1.2 mg/dL    Alkaline Phosphatase 67 40 - 129 U/L    ALT 38 0 - 40 U/L    AST 27 0 - 39 U/L   Troponin   Result Value Ref Range    Troponin, High Sensitivity 12 (H) 0 - 11 ng/L   SPECIMEN REJECTION   Result Value Ref Range    Rejected Test TRP5     Reason for Rejection see below    Troponin   Result Value Ref Range    Troponin, High Sensitivity 10 0 - 11 ng/L   EKG 12 Lead   Result Value Ref Range    Ventricular Rate 71 BPM    Atrial Rate 102 BPM    QRS Duration 112 ms    Q-T Interval 390 ms    QTc Calculation (Bazett) 423 ms    R Axis 13 degrees    T Axis 37 degrees       RADIOLOGY:  XR CHEST (2 VW)   Final Result   No pneumonia or pleural effusion. EKG:  This EKG is signed and interpreted by me. Rate: 71  Rhythm: Atrial fibrillation  Interpretation: EKG obtained demonstrates atrial fib with PVCs, rate 71, normal axis, , no acute ST segment changes. Comparison: stable as compared to patient's most recent EKG      ------------------------- NURSING NOTES AND VITALS REVIEWED ---------------------------  Date / Time Roomed:  8/15/2021 12:26 PM  ED Bed Assignment:  18B/18B-18    The nursing notes within the ED encounter and vital signs as below have been reviewed. Patient Vitals for the past 24 hrs:   BP Temp Temp src Pulse Resp SpO2 Height Weight   08/15/21 1538 (!) 149/108 -- -- 70 18 97 % -- --   08/15/21 1529 (!) 162/94 -- -- -- -- -- -- --   08/15/21 1521 (!) 161/103 -- -- 65 26 98 % -- --   08/15/21 1445 (!) 160/90 -- -- 60 14 98 % -- --   08/15/21 1430 (!) 165/114 -- -- 70 15 98 % -- --   08/15/21 1415 -- -- -- 65 16 96 % -- --   08/15/21 1400 (!) 160/95 -- -- 67 18 97 % -- --   08/15/21 1038 (!) 144/94 -- -- 96 18 98 % 5' 11\" (1.803 m) 202 lb (91.6 kg)   08/15/21 1008 -- 97.3 °F (36.3 °C) Temporal 90 -- 97 % -- --       Oxygen Saturation Interpretation: Normal    ------------------------------------------ PROGRESS NOTES ------------------------------------------  Re-evaluation(s):  Time:1500 Patients symptoms show no change  Repeat physical examination is not changed    Counseling:  I have spoken with the patient and discussed todays results, in addition to providing specific details for the plan of care and counseling regarding the diagnosis and prognosis.   Their questions are answered at this time and they are agreeable with the plan of admission.    --------------------------------- ADDITIONAL PROVIDER NOTES ---------------------------------  Consultations:  Time: 1520. Spoke with [facility]. Discussed case. They will admit the patient. This patient's ED course included: a personal history and physicial examination, re-evaluation prior to disposition and cardiac monitoring    This patient has remained hemodynamically stable during their ED course. Diagnosis:  1. Chest pain, unspecified type        Disposition:  Patient's disposition: Admit to telemetry  Patient's condition is stable. Patient was seen and evaluated by myself and my attending Wyatt Valdez MD. Assessment and Plan discussed with attending provider, please see attestation for final plan of care.      DO Fern Bolivar DO  Resident  08/15/21 0107

## 2021-08-16 ENCOUNTER — TELEPHONE (OUTPATIENT)
Dept: PRIMARY CARE CLINIC | Age: 81
End: 2021-08-16

## 2021-08-16 VITALS
HEART RATE: 83 BPM | WEIGHT: 202 LBS | HEIGHT: 71 IN | RESPIRATION RATE: 16 BRPM | TEMPERATURE: 98.6 F | BODY MASS INDEX: 28.28 KG/M2 | DIASTOLIC BLOOD PRESSURE: 84 MMHG | OXYGEN SATURATION: 97 % | SYSTOLIC BLOOD PRESSURE: 118 MMHG

## 2021-08-16 PROBLEM — I10 UNCONTROLLED HYPERTENSION: Status: RESOLVED | Noted: 2021-08-15 | Resolved: 2021-08-16

## 2021-08-16 LAB
ANION GAP SERPL CALCULATED.3IONS-SCNC: 10 MMOL/L (ref 7–16)
BASOPHILS ABSOLUTE: 0.02 E9/L (ref 0–0.2)
BASOPHILS RELATIVE PERCENT: 0.2 % (ref 0–2)
BUN BLDV-MCNC: 25 MG/DL (ref 6–23)
CALCIUM SERPL-MCNC: 9.5 MG/DL (ref 8.6–10.2)
CHLORIDE BLD-SCNC: 105 MMOL/L (ref 98–107)
CO2: 23 MMOL/L (ref 22–29)
CREAT SERPL-MCNC: 1 MG/DL (ref 0.7–1.2)
EOSINOPHILS ABSOLUTE: 0.22 E9/L (ref 0.05–0.5)
EOSINOPHILS RELATIVE PERCENT: 2.7 % (ref 0–6)
GFR AFRICAN AMERICAN: >60
GFR NON-AFRICAN AMERICAN: >60 ML/MIN/1.73
GLUCOSE BLD-MCNC: 123 MG/DL (ref 74–99)
HCT VFR BLD CALC: 44.8 % (ref 37–54)
HEMOGLOBIN: 15.4 G/DL (ref 12.5–16.5)
IMMATURE GRANULOCYTES #: 0.05 E9/L
IMMATURE GRANULOCYTES %: 0.6 % (ref 0–5)
LYMPHOCYTES ABSOLUTE: 2.31 E9/L (ref 1.5–4)
LYMPHOCYTES RELATIVE PERCENT: 28.7 % (ref 20–42)
MCH RBC QN AUTO: 32.8 PG (ref 26–35)
MCHC RBC AUTO-ENTMCNC: 34.4 % (ref 32–34.5)
MCV RBC AUTO: 95.3 FL (ref 80–99.9)
MONOCYTES ABSOLUTE: 0.63 E9/L (ref 0.1–0.95)
MONOCYTES RELATIVE PERCENT: 7.8 % (ref 2–12)
NEUTROPHILS ABSOLUTE: 4.82 E9/L (ref 1.8–7.3)
NEUTROPHILS RELATIVE PERCENT: 60 % (ref 43–80)
PDW BLD-RTO: 13 FL (ref 11.5–15)
PLATELET # BLD: 190 E9/L (ref 130–450)
PMV BLD AUTO: 10.6 FL (ref 7–12)
POTASSIUM REFLEX MAGNESIUM: 4.6 MMOL/L (ref 3.5–5)
RBC # BLD: 4.7 E12/L (ref 3.8–5.8)
SODIUM BLD-SCNC: 138 MMOL/L (ref 132–146)
WBC # BLD: 8.1 E9/L (ref 4.5–11.5)

## 2021-08-16 PROCEDURE — 85025 COMPLETE CBC W/AUTO DIFF WBC: CPT

## 2021-08-16 PROCEDURE — 80048 BASIC METABOLIC PNL TOTAL CA: CPT

## 2021-08-16 PROCEDURE — 36415 COLL VENOUS BLD VENIPUNCTURE: CPT

## 2021-08-16 PROCEDURE — 6370000000 HC RX 637 (ALT 250 FOR IP): Performed by: NURSE PRACTITIONER

## 2021-08-16 PROCEDURE — 2580000003 HC RX 258: Performed by: NURSE PRACTITIONER

## 2021-08-16 PROCEDURE — G0378 HOSPITAL OBSERVATION PER HR: HCPCS

## 2021-08-16 RX ORDER — LOSARTAN POTASSIUM 25 MG/1
25 TABLET ORAL DAILY
Qty: 30 TABLET | Refills: 3 | Status: SHIPPED | OUTPATIENT
Start: 2021-08-17 | End: 2021-09-27 | Stop reason: SDUPTHER

## 2021-08-16 RX ORDER — HYDRALAZINE HYDROCHLORIDE 25 MG/1
25 TABLET, FILM COATED ORAL EVERY 8 HOURS SCHEDULED
Qty: 90 TABLET | Refills: 3 | Status: SHIPPED | OUTPATIENT
Start: 2021-08-16 | End: 2021-09-01

## 2021-08-16 RX ADMIN — HYDRALAZINE HYDROCHLORIDE 25 MG: 25 TABLET, FILM COATED ORAL at 05:21

## 2021-08-16 RX ADMIN — LOSARTAN POTASSIUM 25 MG: 25 TABLET, FILM COATED ORAL at 08:47

## 2021-08-16 RX ADMIN — ASPIRIN 81 MG: 81 TABLET, COATED ORAL at 08:47

## 2021-08-16 RX ADMIN — SODIUM CHLORIDE, PRESERVATIVE FREE 10 ML: 5 INJECTION INTRAVENOUS at 08:47

## 2021-08-16 ASSESSMENT — PAIN SCALES - GENERAL: PAINLEVEL_OUTOF10: 0

## 2021-08-16 NOTE — FLOWSHEET NOTE
Patient being discharged home with wife. Patient IV and tele removed without complications. Patient given updated medication orders. Dr. Leopoldo Alvarado confirmed patient is to take Losartan 25 mg daily, patient informed.

## 2021-08-16 NOTE — DISCHARGE SUMMARY
Hospital Medicine Discharge Summary    Patient ID: Wesley Funes      Patient's PCP: Bing Lei MD    Admit Date: 8/15/2021     Discharge Date:   8/16/21    Admitting Physician: No admitting provider for patient encounter. Discharge Physician: Brandee Locke MD     Discharge Diagnoses: Active Hospital Problems    Diagnosis Date Noted    Uncontrolled hypertension [I10] 08/15/2021       The patient was seen and examined on day of discharge and this discharge summary is in conjunction with any daily progress note from day of discharge. Hospital Course:     Mr. Wesley Funes is a 79y.o. year old male  who  has a past medical history of Atrial fibrillation ([address] Atrial fibrillation with RVR ([address] Basal cell cancer, Current use of long term anticoagulation, Dyslipidemia, Gastric ulcer, Hyperlipidemia, Hypertension, Myocardial infarct ([address] PVD (peripheral vascular disease) with claudication ([address] S/P femoral-popliteal bypass surgery, Shoulder problem, Tachycardia, and Type 2 diabetes mellitus without complication ([address]    He presented to the ER on 8/15/21 with complaints of HTN. He states that his BP was reading high at home (144/102) and that he was overall not feeling well. He endorsed complaints of palpitations and indigestion yesterday evening but denies complaints of chest pain or SOB.     VS on arrival with /94, HR 90. Labs were unremarkable. CXR negative. EKG with Afib rate 71, nonischemic       Uncontrolled HTN- takes 12.5 mg of losartan daily, states that his BP is generally 066-930 systolic. BP consistently 295-711 mmHg systolic in the ED. He denies chest pain or SOB. Per cardiology note he was previously intolerant of betablockers and cardizem in the past. Will increase losartan to 25 mg and add hydralazine 25 mg PO tid.     Atrial fibrillation- anticoagulated with xarelto.  Rate controlled.      HLD- continue statin     CAD s/p CABG x 4 (2001)     Patient's blood pressure stabilized and he will be discharged home on losartan 25 mg daily and hydralazine 25 mg p.o. 3 times daily  Patient need to follow-up with PCP as outpatient to adjust his blood pressure medications  Exam:     /84   Pulse 83   Temp 98.6 °F (37 °C) (Temporal)   Resp 16   Ht 5' 11\" (1.803 m)   Wt 202 lb (91.6 kg)   SpO2 97%   BMI 28.17 kg/m²     General appearance: No apparent distress, appears stated age and cooperative. HEENT: Pupils equal, round, and reactive to light. Conjunctivae/corneas clear. Neck: Supple, with full range of motion. No jugular venous distention. Trachea midline. Respiratory:  Normal respiratory effort. Clear to auscultation, bilaterally without Rales/Wheezes/Rhonchi. Cardiovascular: Regular rate and rhythm with normal S1/S2 without murmurs, rubs or gallops. Abdomen: Soft, non-tender, non-distended with normal bowel sounds. Musculoskeletal: No clubbing, cyanosis or edema bilaterally. Full range of motion without deformity. Skin: Skin color, texture, turgor normal.  No rashes or lesions. Neurologic:  Neurovascularly intact without any focal sensory/motor deficits. Cranial nerves: II-XII intact, grossly non-focal.  Psychiatric: Alert and oriented, thought content appropriate, normal insight      Consults:     IP CONSULT TO INTERNAL MEDICINE    Significant Diagnostic Studies:       Disposition:  Home      Discharge Instructions/Follow-up:  pcp    Code Status:  Full Code     Activity: activity as tolerated    Diet: cardiac diet    Labs:  For convenience and continuity at follow-up the following most recent labs are provided:      CBC:    Lab Results   Component Value Date    WBC 8.1 08/16/2021    HGB 15.4 08/16/2021    HCT 44.8 08/16/2021     08/16/2021       Renal:    Lab Results   Component Value Date     08/16/2021    K 4.6 08/16/2021     08/16/2021    CO2 23 08/16/2021    BUN 25 08/16/2021    CREATININE 1.0 08/16/2021    CALCIUM 9.5 08/16/2021 Discharge Medications:     Current Discharge Medication List           Details   hydrALAZINE (APRESOLINE) 25 MG tablet Take 1 tablet by mouth every 8 hours  Qty: 90 tablet, Refills: 3              Details   ibuprofen (ADVIL;MOTRIN) 200 MG tablet Take 200 mg by mouth every 6 hours as needed for Pain      zinc gluconate 50 MG tablet Take 50 mg by mouth daily      rosuvastatin (CRESTOR) 5 MG tablet Take 1 tablet by mouth daily  Qty: 90 tablet, Refills: 3      rivaroxaban (XARELTO) 20 MG TABS tablet Take 1 tablet by mouth daily (with breakfast) Indications: pt takes with dinner  Qty: 90 tablet, Refills: 3      losartan (COZAAR) 25 MG tablet Take 1/2 tab daily  Qty: 45 tablet, Refills: 3    Associated Diagnoses: Essential hypertension      celecoxib (CELEBREX) 100 MG capsule Take 100 mg by mouth as needed       Cholecalciferol (VITAMIN D3) 2000 units CAPS Take 1 capsule by mouth daily      aspirin 81 MG EC tablet Take 81 mg by mouth daily       LUMIGAN 0.01 % SOLN Place 1 drop into both eyes daily. Associated Diagnoses: Coronary artery disease; Hyperlipidemia; Paroxysmal atrial fibrillation ([address]             Time Spent on discharge is more than 30 minutes in the examination, evaluation, counseling and review of medications and discharge plan. Patient is stable at discharge  Signed:    Robin Jacome MD   8/16/2021      Thank you Amber Davis MD for the opportunity to be involved in this patient's care. If you have any questions or concerns please feel free to contact me at [phone].

## 2021-08-16 NOTE — TELEPHONE ENCOUNTER
----- Message from Lisa Hough sent at 8/16/2021 11:42 AM EDT -----  Subject: Hospital Follow Up    QUESTIONS  What hospital was the Patient Discharged from? [facility] [place]   Date of Discharge? 2021-08-16  Discharge Location? Home  Reason for hospitalization as patient stated? High Blood Pressure  What question does the patient have, if applicable?   ---------------------------------------------------------------------------  --------------  CALL BACK INFO  What is the best way for the office to contact you? OK to leave message on   voicemail  Preferred Call Back Phone Number? [phone]  ---------------------------------------------------------------------------  --------------  SCRIPT ANSWERS  Relationship to Patient? Other  Representative Name? Wife Jas Coelho  Additional information verified (besides Name and Date of Birth)? Address  (Has the patient been discharged from the hospital within 2 business days   AND does not have a Telephone Encounter  Follow Up From [address] [place] [address]   documented in [address]?)? Yes  Do you have any questions for your primary care provider that need to be   answered prior to your appointment? (Use RN Triage if question pertains to   anything on the red flag list)?  No

## 2021-08-16 NOTE — PLAN OF CARE
Problem: Pain:  Goal: Pain level will decrease  Description: Pain level will decrease  Outcome: Ongoing  Goal: Control of acute pain  Description: Control of acute pain  Outcome: Ongoing  Goal: Control of chronic pain  Description: Control of chronic pain  Outcome: Ongoing     Problem: Falls - Risk of:  Goal: Will remain free from falls  Description: Will remain free from falls  Outcome: Ongoing  Goal: Absence of physical injury  Description: Absence of physical injury  Outcome: Ongoing     Problem:  Activity:  Goal: Ability to tolerate increased activity will improve  Description: Ability to tolerate increased activity will improve  Outcome: Ongoing     Problem: Cardiac:  Goal: Hemodynamic stability will improve  Description: Hemodynamic stability will improve  Outcome: Ongoing  Goal: Complications related to the disease process, condition or treatment will be avoided or minimized  Description: Complications related to the disease process, condition or treatment will be avoided or minimized  Outcome: Ongoing  Goal: Cerebral tissue perfusion will improve  Description: Cerebral tissue perfusion will improve  Outcome: Ongoing  Goal: Ability to maintain vital signs within normal range will improve  Description: Ability to maintain vital signs within normal range will improve  Outcome: Ongoing  Goal: Cardiovascular alteration will improve  Description: Cardiovascular alteration will improve  Outcome: Ongoing     Problem: Coping:  Goal: Ability to identify and develop effective coping behavior will improve  Description: Ability to identify and develop effective coping behavior will improve  Outcome: Ongoing     Problem: Health Behavior:  Goal: Identification of resources available to assist in meeting health care needs will improve  Description: Identification of resources available to assist in meeting health care needs will improve  Outcome: Ongoing  Goal: Will modify at least one risk factor affecting health status  Description: Will modify at least one risk factor affecting health status  Outcome: Ongoing  Goal: Identification of resources available to assist in meeting health care needs will improve  Description: Identification of resources available to assist in meeting health care needs will improve  Outcome: Ongoing     Problem: Nutritional:  Goal: Ability to identify appropriate dietary choices will improve  Description: Ability to identify appropriate dietary choices will improve  Outcome: Ongoing     Problem: Physical Regulation:  Goal: Complications related to the disease process, condition or treatment will be avoided or minimized  Description: Complications related to the disease process, condition or treatment will be avoided or minimized  Outcome: Ongoing

## 2021-08-16 NOTE — PLAN OF CARE
Problem: Pain:  Goal: Pain level will decrease  Description: Pain level will decrease  8/16/2021 1257 by Karen Marina RN  Outcome: Completed  8/16/2021 0941 by Karen Marina RN  Outcome: Ongoing  Goal: Control of acute pain  Description: Control of acute pain  8/16/2021 1257 by Karen Marina RN  Outcome: Completed  8/16/2021 0941 by Karen Marina RN  Outcome: Ongoing  Goal: Control of chronic pain  Description: Control of chronic pain  8/16/2021 1257 by Karen Marina RN  Outcome: Completed  8/16/2021 0941 by Karen Marina RN  Outcome: Ongoing     Problem: Falls - Risk of:  Goal: Will remain free from falls  Description: Will remain free from falls  8/16/2021 1257 by Karen Marina RN  Outcome: Completed  8/16/2021 0941 by Karen Marina RN  Outcome: Ongoing  Goal: Absence of physical injury  Description: Absence of physical injury  8/16/2021 1257 by Karen Marina RN  Outcome: Completed  8/16/2021 0941 by Karen Marina RN  Outcome: Ongoing     Problem:  Activity:  Goal: Ability to tolerate increased activity will improve  Description: Ability to tolerate increased activity will improve  8/16/2021 1257 by Karen Marina RN  Outcome: Completed  8/16/2021 0941 by Karen Marina RN  Outcome: Ongoing     Problem: Cardiac:  Goal: Hemodynamic stability will improve  Description: Hemodynamic stability will improve  8/16/2021 1257 by Karen Marina RN  Outcome: Completed  8/16/2021 0941 by Karen Marina RN  Outcome: Ongoing  Goal: Complications related to the disease process, condition or treatment will be avoided or minimized  Description: Complications related to the disease process, condition or treatment will be avoided or minimized  8/16/2021 1257 by Karen Marina RN  Outcome: Completed  8/16/2021 0941 by Karen Marina RN  Outcome: Ongoing  Goal: Cerebral tissue perfusion will improve  Description: Cerebral tissue perfusion will improve  8/16/2021 1257 by Karen Marina RN  Outcome: Completed  8/16/2021 0941 by Karen Marina RN  Outcome: Ongoing  Goal: Ability to maintain vital signs within normal range will improve  Description: Ability to maintain vital signs within normal range will improve  8/16/2021 1257 by Jennifer Palacios RN  Outcome: Completed  8/16/2021 0941 by Jennifer Palacios RN  Outcome: Ongoing  Goal: Cardiovascular alteration will improve  Description: Cardiovascular alteration will improve  8/16/2021 1257 by Jennifer Palacios RN  Outcome: Completed  8/16/2021 0941 by Jennifer Palacios RN  Outcome: Ongoing     Problem: Coping:  Goal: Ability to identify and develop effective coping behavior will improve  Description: Ability to identify and develop effective coping behavior will improve  8/16/2021 1257 by Jennifer Palacios RN  Outcome: Completed  8/16/2021 0941 by Jennifer Palacios RN  Outcome: Ongoing     Problem: Health Behavior:  Goal: Identification of resources available to assist in meeting health care needs will improve  Description: Identification of resources available to assist in meeting health care needs will improve  8/16/2021 1257 by Jennifer Paalcios RN  Outcome: Completed  8/16/2021 0941 by Jennifer Palacios RN  Outcome: Ongoing  Goal: Will modify at least one risk factor affecting health status  Description: Will modify at least one risk factor affecting health status  8/16/2021 1257 by Jennifer Palacios RN  Outcome: Completed  8/16/2021 0941 by Jennifer Palacios RN  Outcome: Ongoing  Goal: Identification of resources available to assist in meeting health care needs will improve  Description: Identification of resources available to assist in meeting health care needs will improve  8/16/2021 1257 by Jennifer Palacios RN  Outcome: Completed  8/16/2021 0941 by Jennifer Palacios RN  Outcome: Ongoing     Problem: Nutritional:  Goal: Ability to identify appropriate dietary choices will improve  Description: Ability to identify appropriate dietary choices will improve  8/16/2021 1257 by Jennifer Palacios RN  Outcome: Completed  8/16/2021 0941 by Jennifer Palacios RN  Outcome: Ongoing     Problem: Physical Regulation:  Goal: Complications related to the disease process, condition or treatment will be avoided or minimized  Description: Complications related to the disease process, condition or treatment will be avoided or minimized  8/16/2021 1257 by Vicki Hall RN  Outcome: Completed  8/16/2021 0941 by Vicki Hall RN  Outcome: Ongoing

## 2021-08-18 ENCOUNTER — OFFICE VISIT (OUTPATIENT)
Dept: PRIMARY CARE CLINIC | Age: 81
End: 2021-08-18
Payer: MEDICARE

## 2021-08-18 VITALS
HEART RATE: 97 BPM | OXYGEN SATURATION: 98 % | BODY MASS INDEX: 27.72 KG/M2 | DIASTOLIC BLOOD PRESSURE: 70 MMHG | WEIGHT: 198 LBS | RESPIRATION RATE: 18 BRPM | SYSTOLIC BLOOD PRESSURE: 110 MMHG | TEMPERATURE: 97.4 F | HEIGHT: 71 IN

## 2021-08-18 DIAGNOSIS — E11.51 TYPE 2 DIABETES MELLITUS WITH DIABETIC PERIPHERAL ANGIOPATHY WITHOUT GANGRENE, WITHOUT LONG-TERM CURRENT USE OF INSULIN (HCC): ICD-10-CM

## 2021-08-18 DIAGNOSIS — I48.21 PERMANENT ATRIAL FIBRILLATION (HCC): ICD-10-CM

## 2021-08-18 DIAGNOSIS — I95.2 HYPOTENSION DUE TO DRUGS: Primary | ICD-10-CM

## 2021-08-18 DIAGNOSIS — I25.10 CORONARY ARTERY DISEASE INVOLVING NATIVE CORONARY ARTERY OF NATIVE HEART WITHOUT ANGINA PECTORIS: Chronic | ICD-10-CM

## 2021-08-18 DIAGNOSIS — Z79.01 ANTICOAGULATED: ICD-10-CM

## 2021-08-18 DIAGNOSIS — R00.1 BRADYCARDIA: ICD-10-CM

## 2021-08-18 DIAGNOSIS — I10 ESSENTIAL HYPERTENSION: ICD-10-CM

## 2021-08-18 PROCEDURE — 99214 OFFICE O/P EST MOD 30 MIN: CPT | Performed by: INTERNAL MEDICINE

## 2021-08-18 NOTE — PROGRESS NOTES
traveling to [facility] in about 3 weeks. He remains on long-term anticoagulation with Xarelto and has not had any bleeding issues. Review of Systems: as per HPI      Physical Exam:    Patient is a 79 y.o. male. Patient appears to be in no distress. Breathing comfortably. Ambulates without assistance. HEENT: normal.  Neck supple, no adenopathy or bruits. Heart irregularly irregular rhythm with an apical rate of 80 bpm.  No murmurs gallops or rubs. Lungs clear. Abd: normal  Ext: no edema. Peripheral pulses: normal.  No neurologic deficits noted. Lab Results   Component Value Date    WBC 8.1 08/16/2021    HGB 15.4 08/16/2021    HCT 44.8 08/16/2021     08/16/2021    CHOL 143 07/13/2021    TRIG 100 07/13/2021    HDL 47 07/13/2021    ALT 38 08/15/2021    AST 27 08/15/2021    TSH 2.600 06/30/2020    PSA 1.01 01/12/2021    INR 1.5 10/11/2011    LABA1C 6.3 (H) 07/13/2021      Lab Results   Component Value Date     08/16/2021    K 4.6 08/16/2021     08/16/2021    CO2 23 08/16/2021    BUN 25 (H) 08/16/2021    CREATININE 1.0 08/16/2021    GLUCOSE 123 (H) 08/16/2021    CALCIUM 9.5 08/16/2021    PROT 7.3 08/15/2021    LABALBU 4.3 08/15/2021    BILITOT 0.7 08/15/2021    ALKPHOS 67 08/15/2021    AST 27 08/15/2021    ALT 38 08/15/2021    LABGLOM >60 08/16/2021    GFRAA >60 08/16/2021            Assessment:    Tobias Callaway was seen today for hypertension. Diagnoses and all orders for this visit:    Hypotension due to drugs    Type 2 diabetes mellitus with diabetic peripheral angiopathy without gangrene, without long-term current use of insulin (HCC)    Essential hypertension    Permanent atrial fibrillation ([address] currently with a controlled ventricular rate. Anticoagulated on Xarelto with no reported bleeding issues.     Bradycardia    Coronary artery disease involving native coronary artery of native heart without angina pectoris, stable and followed by his cardiologist.          Discussion Notes: He will

## 2021-09-01 ENCOUNTER — OFFICE VISIT (OUTPATIENT)
Dept: PRIMARY CARE CLINIC | Age: 81
End: 2021-09-01
Payer: MEDICARE

## 2021-09-01 VITALS
SYSTOLIC BLOOD PRESSURE: 130 MMHG | RESPIRATION RATE: 18 BRPM | DIASTOLIC BLOOD PRESSURE: 88 MMHG | BODY MASS INDEX: 28.49 KG/M2 | WEIGHT: 199 LBS | OXYGEN SATURATION: 98 % | HEIGHT: 70 IN | HEART RATE: 65 BPM | TEMPERATURE: 97.4 F

## 2021-09-01 DIAGNOSIS — Z23 ENCOUNTER FOR IMMUNIZATION: ICD-10-CM

## 2021-09-01 DIAGNOSIS — I10 ESSENTIAL HYPERTENSION: Primary | ICD-10-CM

## 2021-09-01 DIAGNOSIS — I70.209 ATHEROSCLEROTIC PERIPHERAL VASCULAR DISEASE (HCC): ICD-10-CM

## 2021-09-01 DIAGNOSIS — E11.51 TYPE 2 DIABETES MELLITUS WITH DIABETIC PERIPHERAL ANGIOPATHY WITHOUT GANGRENE, WITHOUT LONG-TERM CURRENT USE OF INSULIN (HCC): ICD-10-CM

## 2021-09-01 DIAGNOSIS — I25.10 CORONARY ARTERY DISEASE INVOLVING NATIVE CORONARY ARTERY OF NATIVE HEART WITHOUT ANGINA PECTORIS: Chronic | ICD-10-CM

## 2021-09-01 DIAGNOSIS — I48.21 PERMANENT ATRIAL FIBRILLATION (HCC): ICD-10-CM

## 2021-09-01 DIAGNOSIS — Z79.01 ANTICOAGULATED: ICD-10-CM

## 2021-09-01 PROCEDURE — G0008 ADMIN INFLUENZA VIRUS VAC: HCPCS | Performed by: INTERNAL MEDICINE

## 2021-09-01 PROCEDURE — 90694 VACC AIIV4 NO PRSRV 0.5ML IM: CPT | Performed by: INTERNAL MEDICINE

## 2021-09-01 PROCEDURE — 99213 OFFICE O/P EST LOW 20 MIN: CPT | Performed by: INTERNAL MEDICINE

## 2021-09-01 NOTE — PATIENT INSTRUCTIONS
Patient Education        Influenza (Flu) Vaccine (Inactivated or Recombinant): What You Need to Know  Why get vaccinated? Influenza vaccine can prevent influenza (flu). Flu is a contagious disease that spreads around the [place] every year, usually between October and May. Anyone can get the flu, but it is more dangerous for some people. Infants and young children, people 72years of age and older, pregnant women, and people with certain health conditions or a weakened immune system are at greatest risk of flu complications. Pneumonia, bronchitis, sinus infections and ear infections are examples of flu-related complications. If you have a medical condition, such as heart disease, cancer or diabetes, flu can make it worse. Flu can cause fever and chills, sore throat, muscle aches, fatigue, cough, headache, and runny or stuffy nose. Some people may have vomiting and diarrhea, though this is more common in children than adults. Each year, thousands of people in the [facility] die from flu, and many more are hospitalized. Flu vaccine prevents millions of illnesses and flu-related visits to the doctor each year. Influenza vaccine  CDC recommends everyone 10months of age and older get vaccinated every flu season. Children 6 months through 6years of age may need 2 doses during a single flu season. Everyone else needs only 1 dose each flu season. It takes about 2 weeks for protection to develop after vaccination. There are many flu viruses, and they are always changing. Each year a new flu vaccine is made to protect against three or four viruses that are likely to cause disease in the upcoming flu season. Even when the vaccine doesn't exactly match these viruses, it may still provide some protection. Influenza vaccine does not cause flu. Influenza vaccine may be given at the same time as other vaccines.   Talk with your health care provider  Tell your vaccine provider if the person getting the vaccine:  · Has had an allergic reaction after a previous dose of influenza vaccine, or has any severe, life-threatening allergies. · Has ever had Guillain-Barré Syndrome (also called GBS). In some cases, your health care provider may decide to postpone influenza vaccination to a future visit. People with minor illnesses, such as a cold, may be vaccinated. People who are moderately or severely ill should usually wait until they recover before getting influenza vaccine. Your health care provider can give you more information. Risks of a vaccine reaction  · Soreness, redness, and swelling where shot is given, fever, muscle aches, and headache can happen after influenza vaccine. · There may be a very small increased risk of Guillain-Barré Syndrome (GBS) after inactivated influenza vaccine (the flu shot). Marylu Loza children who get the flu shot along with pneumococcal vaccine (PCV13), and/or DTaP vaccine at the same time might be slightly more likely to have a seizure caused by fever. Tell your health care provider if a child who is getting flu vaccine has ever had a seizure. People sometimes faint after medical procedures, including vaccination. Tell your provider if you feel dizzy or have vision changes or ringing in the ears. As with any medicine, there is a very remote chance of a vaccine causing a severe allergic reaction, other serious injury, or death. What if there is a serious problem? An allergic reaction could occur after the vaccinated person leaves the clinic. If you see signs of a severe allergic reaction (hives, swelling of the face and throat, difficulty breathing, a fast heartbeat, dizziness, or weakness), call 9-1-1 and get the person to the nearest hospital.  For other signs that concern you, call your health care provider. Adverse reactions should be reported to the Vaccine Adverse Event Reporting System (VAERS).  Your health care provider will usually file this report, or you can do it yourself. Visit the VAERS website at www.vaers. hhs.gov or call 0-819.153.7285. VAERS is only for reporting reactions, and VAERS staff do not give medical advice. The National Vaccine Injury Compensation Program  The National Vaccine Injury Compensation Program (VICP) is a federal program that was created to compensate people who may have been injured by certain vaccines. Visit the VICP website at www.[facility].gov/vaccinecompensation or call 8-492.700.1050 to learn about the program and about filing a claim. There is a time limit to file a claim for compensation. How can I learn more? · Ask your healthcare provider. · Call your local or state health department. · Contact the Centers for Disease Control and Prevention (CDC):  ? Call 5-978.654.4314 (1-800-CDC-INFO) or  ? Visit CDC's website at www.cdc.gov/flu  Vaccine Information Statement (Interim)  Inactivated Influenza Vaccine  8/15/2019  [address] 696LI-98  Department of Health and Human Services  Centers for Disease Control and Prevention  Many Vaccine Information Statements are available in Bengali and other languages. See www.immunize.org/vis. Muchas hojas de información sobre vacunas están disponibles en español y en otros idiomas. Visite www.immunize.org/vis. Care instructions adapted under license by [facility] ([facility]). If you have questions about a medical condition or this instruction, always ask your healthcare professional. [place] [address] any warranty or liability for your use of this information.

## 2021-09-01 NOTE — PROGRESS NOTES
Wesley Funes  9/1/21     Chief Complaint   Patient presents with    Hypertension     2 week follow up         Allergies   Allergen Reactions    Diltiazem Hcl      Bradycardia          Current Outpatient Medications   Medication Sig Dispense Refill    losartan (COZAAR) 25 MG tablet Take 1 tablet by mouth daily 30 tablet 3    ibuprofen (ADVIL;MOTRIN) 200 MG tablet Take 200 mg by mouth every 6 hours as needed for Pain      rosuvastatin (CRESTOR) 5 MG tablet Take 1 tablet by mouth daily 90 tablet 3    rivaroxaban (XARELTO) 20 MG TABS tablet Take 1 tablet by mouth daily (with breakfast) Indications: pt takes with dinner 90 tablet 3    celecoxib (CELEBREX) 100 MG capsule Take 100 mg by mouth as needed       Cholecalciferol (VITAMIN D3) 2000 units CAPS Take 1 capsule by mouth daily      aspirin 81 MG EC tablet Take 81 mg by mouth daily       LUMIGAN 0.01 % SOLN Place 1 drop into both eyes daily.  hydrALAZINE (APRESOLINE) 25 MG tablet Take 1 tablet by mouth every 8 hours (Patient not taking: Reported on 9/1/2021) 90 tablet 3    zinc gluconate 50 MG tablet Take 50 mg by mouth daily (Patient not taking: Reported on 9/1/2021)       No current facility-administered medications for this visit. HPI: Patient returns for follow-up visit. Since last visit he has been feeling much better since he stopped taking the hydralazine which was prescribed when he was in the hospital recently. He remains on losartan 25 mg daily. His average blood pressure readings have been 374 systolic and low 87G diastolic on average. He denies any chest pain or shortness of breath. Review of Systems: as per HPI      Physical Exam:    Patient is a 79 y.o. male. Patient appears to be in no distress. Breathing comfortably. Ambulates without assistance. HEENT: normal.  Neck supple, no adenopathy or bruits. Heart RR, no MGR. Lungs clear. Abd: normal  Ext: no edema.  Peripheral pulses: normal.  No neurologic deficits noted. Lab Results   Component Value Date    WBC 8.1 08/16/2021    HGB 15.4 08/16/2021    HCT 44.8 08/16/2021     08/16/2021    CHOL 143 07/13/2021    TRIG 100 07/13/2021    HDL 47 07/13/2021    ALT 38 08/15/2021    AST 27 08/15/2021    TSH 2.600 06/30/2020    PSA 1.01 01/12/2021    INR 1.5 10/11/2011    LABA1C 6.3 (H) 07/13/2021      Lab Results   Component Value Date     08/16/2021    K 4.6 08/16/2021     08/16/2021    CO2 23 08/16/2021    BUN 25 (H) 08/16/2021    CREATININE 1.0 08/16/2021    GLUCOSE 123 (H) 08/16/2021    CALCIUM 9.5 08/16/2021    PROT 7.3 08/15/2021    LABALBU 4.3 08/15/2021    BILITOT 0.7 08/15/2021    ALKPHOS 67 08/15/2021    AST 27 08/15/2021    ALT 38 08/15/2021    LABGLOM >60 08/16/2021    GFRAA >60 08/16/2021            Assessment:    Yaritza Winter was seen today for hypertension. Diagnoses and all orders for this visit:    Essential hypertension, well controlled on losartan 25 mg daily. Encounter for immunization  -     INFLUENZA, QUADV, ADJUVANTED, 72 YRS =, IM, PF, PREFILL SYR, 0.5ML (FLUAD)    Permanent atrial fibrillation (HCC), rate controlled and on long-term anticoagulation with Xarelto. No reported bleeding issues. Coronary artery disease involving native coronary artery of native heart without angina pectoris    Atherosclerotic peripheral vascular disease ([facility] [address] stable and followed by his vascular surgeon. Type 2 diabetes mellitus with diabetic peripheral angiopathy without gangrene, without long-term current use of insulin (HCC) well-controlled with most recent hemoglobin A1c 6.3%. Other orders  -     Cancel: INFLUENZA, QUADV, ADJUVANTED, 72 YRS =, IM, PF, PREFILL SYR, 0.5ML (FLUAD)          Discussion Notes: Continue all of his usual meds and supplements the same as listed on his med list.  He will continue to monitor his blood pressure at home.   He will call if he has any further problems otherwise he is due to return here in about 4 months for his annual physical.

## 2021-09-27 DIAGNOSIS — I10 ESSENTIAL HYPERTENSION: Primary | ICD-10-CM

## 2021-09-27 RX ORDER — LOSARTAN POTASSIUM 25 MG/1
25 TABLET ORAL DAILY
Qty: 90 TABLET | Refills: 3 | Status: SHIPPED
Start: 2021-09-27 | End: 2022-09-19 | Stop reason: SDUPTHER

## 2021-12-01 ENCOUNTER — OFFICE VISIT (OUTPATIENT)
Dept: PRIMARY CARE CLINIC | Age: 81
End: 2021-12-01
Payer: MEDICARE

## 2021-12-01 VITALS
TEMPERATURE: 97.2 F | RESPIRATION RATE: 18 BRPM | BODY MASS INDEX: 29.35 KG/M2 | HEIGHT: 70 IN | OXYGEN SATURATION: 97 % | WEIGHT: 205 LBS | DIASTOLIC BLOOD PRESSURE: 78 MMHG | SYSTOLIC BLOOD PRESSURE: 120 MMHG | HEART RATE: 84 BPM

## 2021-12-01 DIAGNOSIS — M79.10 MYALGIA: ICD-10-CM

## 2021-12-01 DIAGNOSIS — M25.511 BILATERAL SHOULDER PAIN, UNSPECIFIED CHRONICITY: Primary | ICD-10-CM

## 2021-12-01 DIAGNOSIS — I10 ESSENTIAL HYPERTENSION: ICD-10-CM

## 2021-12-01 DIAGNOSIS — M25.512 BILATERAL SHOULDER PAIN, UNSPECIFIED CHRONICITY: Primary | ICD-10-CM

## 2021-12-01 PROCEDURE — 99213 OFFICE O/P EST LOW 20 MIN: CPT | Performed by: INTERNAL MEDICINE

## 2021-12-01 NOTE — PROGRESS NOTES
Aarti Marsh  12/1/21     Chief Complaint   Patient presents with    Shoulder Pain     B/L        Allergies   Allergen Reactions    Diltiazem Hcl      Bradycardia          Current Outpatient Medications   Medication Sig Dispense Refill    losartan (COZAAR) 25 MG tablet Take 1 tablet by mouth daily 90 tablet 3    ibuprofen (ADVIL;MOTRIN) 200 MG tablet Take 200 mg by mouth every 6 hours as needed for Pain      rosuvastatin (CRESTOR) 5 MG tablet Take 1 tablet by mouth daily 90 tablet 3    rivaroxaban (XARELTO) 20 MG TABS tablet Take 1 tablet by mouth daily (with breakfast) Indications: pt takes with dinner 90 tablet 3    celecoxib (CELEBREX) 100 MG capsule Take 100 mg by mouth as needed       aspirin 81 MG EC tablet Take 81 mg by mouth daily       LUMIGAN 0.01 % SOLN Place 1 drop into both eyes daily. No current facility-administered medications for this visit. HPI: Patient comes in complaining of bilateral shoulder pain over the past several months. He denies any known injury. He also has some pain at the base of the left side of his neck which radiates toward the left shoulder. It is caused him to stop golfing. Also sometimes his shoulders feel weak. Also he has been monitoring his blood pressures at home and they have been somewhat labile but on average under good control. Currently he is just taking losartan 25 mg daily. Review of Systems: as per HPI      Physical Exam:    Patient is a 79 y.o. male. Patient appears to be in no distress. Breathing comfortably. Ambulates without assistance. HEENT: normal.  Neck supple, no adenopathy or bruits. Heart RR, no MGR. Lungs clear. Abd: normal  Ext: no edema. Joints: Both shoulders appear normal with no deformity and with fairly good range of motion and no tenderness. Peripheral pulses: normal.  No neurologic deficits noted.     Lab Results   Component Value Date    WBC 8.1 08/16/2021    HGB 15.4 08/16/2021    HCT 44.8 08/16/2021    PLT 190 08/16/2021    CHOL 143 07/13/2021    TRIG 100 07/13/2021    HDL 47 07/13/2021    ALT 38 08/15/2021    AST 27 08/15/2021    TSH 2.600 06/30/2020    PSA 1.01 01/12/2021    INR 1.5 10/11/2011    LABA1C 6.3 (H) 07/13/2021      Lab Results   Component Value Date     08/16/2021    K 4.6 08/16/2021     08/16/2021    CO2 23 08/16/2021    BUN 25 (H) 08/16/2021    CREATININE 1.0 08/16/2021    GLUCOSE 123 (H) 08/16/2021    CALCIUM 9.5 08/16/2021    PROT 7.3 08/15/2021    LABALBU 4.3 08/15/2021    BILITOT 0.7 08/15/2021    ALKPHOS 67 08/15/2021    AST 27 08/15/2021    ALT 38 08/15/2021    LABGLOM >60 08/16/2021    GFRAA >60 08/16/2021            Assessment:    Adama Abdul was seen today for shoulder pain. Diagnoses and all orders for this visit:    Bilateral shoulder pain, unspecified chronicity  -     Karrie Islas MD, Orthopaedics, [place] ([facility], [facility])    Myalgia  -     Rheumatoid Factor; Future  -     Sedimentation Rate; Future  -     LYNNETTE; Future  -     C-Reactive Protein; Future    Essential hypertension          Discussion Notes: Refer patient to orthopedic surgeon for further evaluation and recommendations. Also will get a sed rate, CRP, LYNNETTE, and rheumatoid factor to rule out underlying rheumatologic condition.

## 2022-01-24 DIAGNOSIS — E11.51 TYPE 2 DIABETES MELLITUS WITH DIABETIC PERIPHERAL ANGIOPATHY WITHOUT GANGRENE, WITHOUT LONG-TERM CURRENT USE OF INSULIN (HCC): ICD-10-CM

## 2022-01-24 DIAGNOSIS — I70.209 ATHEROSCLEROTIC PERIPHERAL VASCULAR DISEASE (HCC): ICD-10-CM

## 2022-01-24 DIAGNOSIS — I10 ESSENTIAL HYPERTENSION: ICD-10-CM

## 2022-01-24 DIAGNOSIS — Z12.5 SCREENING FOR PROSTATE CANCER: ICD-10-CM

## 2022-01-24 DIAGNOSIS — M79.10 MYALGIA: ICD-10-CM

## 2022-01-24 LAB
ALBUMIN SERPL-MCNC: 4.4 G/DL (ref 3.5–5.2)
ALP BLD-CCNC: 74 U/L (ref 40–129)
ALT SERPL-CCNC: 38 U/L (ref 0–40)
ANION GAP SERPL CALCULATED.3IONS-SCNC: 15 MMOL/L (ref 7–16)
AST SERPL-CCNC: 28 U/L (ref 0–39)
BASOPHILS ABSOLUTE: 0.04 E9/L (ref 0–0.2)
BASOPHILS RELATIVE PERCENT: 0.8 % (ref 0–2)
BILIRUB SERPL-MCNC: 0.6 MG/DL (ref 0–1.2)
BUN BLDV-MCNC: 23 MG/DL (ref 6–23)
C-REACTIVE PROTEIN: 0.3 MG/DL (ref 0–0.4)
CALCIUM SERPL-MCNC: 10 MG/DL (ref 8.6–10.2)
CHLORIDE BLD-SCNC: 102 MMOL/L (ref 98–107)
CHOLESTEROL, TOTAL: 153 MG/DL (ref 0–199)
CO2: 23 MMOL/L (ref 22–29)
CREAT SERPL-MCNC: 0.9 MG/DL (ref 0.7–1.2)
EOSINOPHILS ABSOLUTE: 0.2 E9/L (ref 0.05–0.5)
EOSINOPHILS RELATIVE PERCENT: 3.8 % (ref 0–6)
GFR AFRICAN AMERICAN: >60
GFR NON-AFRICAN AMERICAN: >60 ML/MIN/1.73
GLUCOSE BLD-MCNC: 119 MG/DL (ref 74–99)
HCT VFR BLD CALC: 47.9 % (ref 37–54)
HDLC SERPL-MCNC: 48 MG/DL
HEMOGLOBIN: 16 G/DL (ref 12.5–16.5)
IMMATURE GRANULOCYTES #: 0.01 E9/L
IMMATURE GRANULOCYTES %: 0.2 % (ref 0–5)
LDL CHOLESTEROL CALCULATED: 83 MG/DL (ref 0–99)
LYMPHOCYTES ABSOLUTE: 1.78 E9/L (ref 1.5–4)
LYMPHOCYTES RELATIVE PERCENT: 34 % (ref 20–42)
MCH RBC QN AUTO: 33.3 PG (ref 26–35)
MCHC RBC AUTO-ENTMCNC: 33.4 % (ref 32–34.5)
MCV RBC AUTO: 99.8 FL (ref 80–99.9)
MONOCYTES ABSOLUTE: 0.38 E9/L (ref 0.1–0.95)
MONOCYTES RELATIVE PERCENT: 7.3 % (ref 2–12)
NEUTROPHILS ABSOLUTE: 2.83 E9/L (ref 1.8–7.3)
NEUTROPHILS RELATIVE PERCENT: 53.9 % (ref 43–80)
PDW BLD-RTO: 12.8 FL (ref 11.5–15)
PLATELET # BLD: 167 E9/L (ref 130–450)
PMV BLD AUTO: 11.7 FL (ref 7–12)
POTASSIUM SERPL-SCNC: 4.8 MMOL/L (ref 3.5–5)
PROSTATE SPECIFIC ANTIGEN: 1.05 NG/ML (ref 0–4)
RBC # BLD: 4.8 E12/L (ref 3.8–5.8)
REASON FOR REJECTION: NORMAL
REJECTED TEST: NORMAL
RHEUMATOID FACTOR: 71 IU/ML (ref 0–13)
SEDIMENTATION RATE, ERYTHROCYTE: 7 MM/HR (ref 0–15)
SODIUM BLD-SCNC: 140 MMOL/L (ref 132–146)
TOTAL PROTEIN: 7.8 G/DL (ref 6.4–8.3)
TRIGL SERPL-MCNC: 112 MG/DL (ref 0–149)
TSH SERPL DL<=0.05 MIU/L-ACNC: 3.35 UIU/ML (ref 0.27–4.2)
VLDLC SERPL CALC-MCNC: 22 MG/DL
WBC # BLD: 5.2 E9/L (ref 4.5–11.5)

## 2022-01-25 LAB — ANTI-NUCLEAR ANTIBODY (ANA): NEGATIVE

## 2022-02-22 ENCOUNTER — OFFICE VISIT (OUTPATIENT)
Dept: PRIMARY CARE CLINIC | Age: 82
End: 2022-02-22
Payer: MEDICARE

## 2022-02-22 VITALS
HEART RATE: 81 BPM | TEMPERATURE: 96.9 F | OXYGEN SATURATION: 96 % | RESPIRATION RATE: 18 BRPM | DIASTOLIC BLOOD PRESSURE: 82 MMHG | WEIGHT: 208 LBS | SYSTOLIC BLOOD PRESSURE: 120 MMHG | HEIGHT: 70 IN | BODY MASS INDEX: 29.78 KG/M2

## 2022-02-22 DIAGNOSIS — I48.21 PERMANENT ATRIAL FIBRILLATION (HCC): ICD-10-CM

## 2022-02-22 DIAGNOSIS — E11.51 TYPE 2 DIABETES MELLITUS WITH DIABETIC PERIPHERAL ANGIOPATHY WITHOUT GANGRENE, WITHOUT LONG-TERM CURRENT USE OF INSULIN (HCC): Primary | ICD-10-CM

## 2022-02-22 DIAGNOSIS — I10 ESSENTIAL HYPERTENSION: ICD-10-CM

## 2022-02-22 DIAGNOSIS — I70.209 ATHEROSCLEROTIC PERIPHERAL VASCULAR DISEASE (HCC): ICD-10-CM

## 2022-02-22 DIAGNOSIS — I25.10 CORONARY ARTERY DISEASE INVOLVING NATIVE CORONARY ARTERY OF NATIVE HEART WITHOUT ANGINA PECTORIS: Chronic | ICD-10-CM

## 2022-02-22 DIAGNOSIS — I71.40 ABDOMINAL AORTIC ANEURYSM (AAA) WITHOUT RUPTURE: Chronic | ICD-10-CM

## 2022-02-22 DIAGNOSIS — Z79.01 ANTICOAGULATED: ICD-10-CM

## 2022-02-22 DIAGNOSIS — E78.2 MIXED HYPERLIPIDEMIA: ICD-10-CM

## 2022-02-22 PROBLEM — R00.1 BRADYCARDIA: Status: RESOLVED | Noted: 2021-08-18 | Resolved: 2022-02-22

## 2022-02-22 LAB — HBA1C MFR BLD: 6.1 %

## 2022-02-22 PROCEDURE — 83036 HEMOGLOBIN GLYCOSYLATED A1C: CPT | Performed by: INTERNAL MEDICINE

## 2022-02-22 PROCEDURE — 99215 OFFICE O/P EST HI 40 MIN: CPT | Performed by: INTERNAL MEDICINE

## 2022-02-22 NOTE — PROGRESS NOTES
Alesia Lawrence  2/22/22     Chief Complaint   Patient presents with    Hyperlipidemia     PHYSICAL        Allergies   Allergen Reactions    Diltiazem Hcl      Bradycardia          Current Outpatient Medications   Medication Sig Dispense Refill    losartan (COZAAR) 25 MG tablet Take 1 tablet by mouth daily 90 tablet 3    ibuprofen (ADVIL;MOTRIN) 200 MG tablet Take 200 mg by mouth every 6 hours as needed for Pain      rosuvastatin (CRESTOR) 5 MG tablet Take 1 tablet by mouth daily 90 tablet 3    rivaroxaban (XARELTO) 20 MG TABS tablet Take 1 tablet by mouth daily (with breakfast) Indications: pt takes with dinner 90 tablet 3    celecoxib (CELEBREX) 100 MG capsule Take 100 mg by mouth as needed       aspirin 81 MG EC tablet Take 81 mg by mouth daily       LUMIGAN 0.01 % SOLN Place 1 drop into both eyes daily. No current facility-administered medications for this visit. HPI: Patient comes in for his annual physical.  He is now 79years of age. Currently feels fairly well. He denies any chest pain or shortness of breath. He remains physically active and exercises on a regular basis. He tries to follow a heart healthy diet. He follows up with his cardiologist on a regular basis for his history of coronary artery disease and atrial fibrillation. He remains on long-term anticoagulation with Xarelto and has not had any bleeding issues. Elen on all his other meds and supplements the same as listed on her med list, which was reviewed with him. Also he follows up with his vascular surgeon for his history of small aortic aneurysm and peripheral vascular disease. He states he tries to follow a low-cholesterol, low-sodium, low refined carbohydrate, heart healthy diet. He saw Dr. Isai Zavala, orthopedic shoulder specialist for his bilateral shoulder pain and did some physical therapy and he is feeling much better and is able to swing a golf club without any difficulty.   He had a therapy work-up including a sed rate, CRP, LYNNETTE, and rheumatoid factor. All was normal except for an elevated rheumatoid factor. Review of Systems    General:   no weight change, no malaise, no fatigue, no change in appetite, no sleep disturbance, no fever/chills, no night sweats. Skin:                no abnormal pigmentation, no rash, no scaling, no itching, no masses, no hair or nail changes  Eyes:               no blurring, no diplopia, no eye pain, no glaucoma, no cataracts  ENT:                 no hearing loss, no tinnitus, no vertigo, no nosebleed, no nasal congestion, no rhinorrhea, no sore throat, no jaw pain, no hoarseness,  no bleeding    Neck:     no node tenderness , not rigid, no masses   Respiratory:           no cough, no sputum, no coughing blood, no pleuritic , no chest pain, no dyspnea,  no wheezing  Cardiovascular:         no angina, no chest pain  No syncope, no pedal edema , no orthopnea, no PND, no palpitations, no claudication  Gastrointestinal  no nausea, no vomiting, no heartburn, no diarrhea, no constipation, no bloating, no abdominal pain, no rectal pain, no bleeding no hemorrhoids, no hernia. Genitourinary:            no urinary urgency, no frequency, no dysuria, no nocturia, no hesitancy, no  incontinence, no bleeding, no stones  Musculoskeletal:        no arthritis, no  arthralgia, no myalgia, no  weakness,  no morning stiffness, no joint swelling   Neurologic:                 no paralysis, no paresis, no  paresthesia, no seizures, no tremors, no headaches, no tumors , no stroke, no speech issues,  No incoordination, no head trauma, no memory loss/concentration  Hematologic:              no anemia, no abnormal bleeding / bruising, no fever, no chills, no night sweats, no wollen glands, no unexplained weight loss  Endocrine:        no heat or cold intolerance and no polyphagia, polydipsia,  or polyuria  Psych:  Denies anxiety or depression. Physical Exam:  Patient is an 79 y.o. male     Constitutional:  General Appearance: Well-appearing. Level of Distress: NAD. Ambulation: ambulating normally. He is moderately overweight. Psychiatric:  Insight: good judgment: Mental status: normal mood and affect. Orientation: oriented to time place and person. Memory: recent memory normal and remote memory normal.     Head: normocephalic and atraumatic. Eyes:  Lids and Conjunctiva: Non-injected and no pallor. Pupils: PERRLA, Corneas: grossly intact. EOMI, Lens: clear. Sclerae: non-icteric. Vision: peripheral vision grossly intact and acuity grossly intact. ENMT:  Ears: no lesions on external ear. TMs clear and TM mobility normal.  Hearing: no hearing loss. Nose: No lesions on external nose, septal deviation sinus tenderness or nasal discharge and nares patent and nasal passages clear. Lips, Teeth and Gums:  no mouth or lip ulcers or bleeding gums and normal dentition. Oropharynx: no erythema or exudates and moist mucous membranes and tonsils not enlarged. Neck:  Neck supple, FROM, trachea midline, and no masses. Lymph nodes: no cervical LAD, supraclavicular LAD, axillary LAD, or inguinal LAD. Thyroid: non-tender and no enlargement. Lungs:  Respiratory effort, no dyspnea. Auscultation: no rales/crackles or rhonchi and breath sounds normal, good air movement and CTA except as noted. Cardiovascular: Apical impulse:not displaced. Heart: Auscultation: normal S1 and S2, no murmurs, rubs or gallops. Irregularly irregular rhythm with an apical rate of approximately 80 bpm.  Neck vessels: no carotid bruits. Pulses including femoral/pedal: normal throughout. Abdomen: Bowel sounds: normal.  Inspection and Palpation: no tenderness, guarding, masses, rebound tenderness or CVA tenderness and soft and non-distended. Liver: non-tender and no hepatomegaly. Spleen: non-tender and no splenomegaly. Hernia: none palpable.     Musculoskeletal: Motor Strength and Tone: normal tone and motor strength. Joints, Bones and Muscles: no malalignment, tenderness or bony abnormalities and normal movement of all extremities. Extremities: no cyanosis, edema, varicosities, or palpable cord. Neurologic:  Gait and Station: normal gait and station. Cranial nerves:grossly intact. Sensation:grossly intact and monofilament test intact. Reflexes: DTRs 2+ bilaterally throughout. Coordination and Cerebellum: finger to nose intact and no tremor. Skin: Inspection and palpation: no rash, lesions, ulcer, induration, nodules, jaundice or abnormal nevi and good turgor. Nails: normal.     Back:  Thoracolumbar Appearance: normal curvature. I have examined the patient's feet and found no evidence of deformities, calluses, ulcerations, or evidence of neuropathy. Lab Results   Component Value Date    WBC 5.2 01/24/2022    HGB 16.0 01/24/2022    HCT 47.9 01/24/2022     01/24/2022    CHOL 153 01/24/2022    TRIG 112 01/24/2022    HDL 48 01/24/2022    ALT 38 01/24/2022    AST 28 01/24/2022    TSH 3.350 01/24/2022    PSA 1.05 01/24/2022    INR 1.5 10/11/2011    LABA1C 6.1 02/22/2022      Lab Results   Component Value Date     01/24/2022    K 4.8 01/24/2022     01/24/2022    CO2 23 01/24/2022    BUN 23 01/24/2022    CREATININE 0.9 01/24/2022    GLUCOSE 119 (H) 01/24/2022    CALCIUM 10.0 01/24/2022    PROT 7.8 01/24/2022    LABALBU 4.4 01/24/2022    BILITOT 0.6 01/24/2022    ALKPHOS 74 01/24/2022    AST 28 01/24/2022    ALT 38 01/24/2022    LABGLOM >60 01/24/2022    GFRAA >60 01/24/2022            Assessment:    Type 2 diabetes mellitus with diabetic peripheral angiopathy without gangrene, without long-term current use of insulin ([address] currently well controlled with a hemoglobin A1c of 6.3%. -     POCT glycosylated hemoglobin (Hb A1C)  -     Hemoglobin A1C; Future    Essential hypertension, well controlled on current meds. -     Comprehensive Metabolic Panel;  Future    Anticoagulated on Xarelto 20 mg daily with no reported bleeding issues. -     CBC; Future    Mixed hyperlipidemia, well controlled on rosuvastatin 5 mg daily.  -     Lipid Panel; Future    Abdominal aortic aneurysm (AAA) without rupture (HCC) currently stable and followed by vascular surgery. Coronary artery disease involving native coronary artery of native heart without angina pectoris, currently stable and followed by cardiology. Permanent atrial fibrillation ([address] rate controlled. Atherosclerotic peripheral vascular disease ([facility] [address] stable and followed by vascular surgery. Discussion Notes: He will continue all of his current meds and supplements the same as listed on his med list.  He is encouraged to try to follow a low-cholesterol, low refined carbohydrate, heart healthy diet and continue exercising on a regular basis. He will follow-up with his cardiologist and vascular surgeon as per their instructions. Return here as needed or in 6 months for routine follow-up visit and labs including a CMP, hemoglobin A1c, CBC, and lipid panel.

## 2022-02-24 ENCOUNTER — NURSE ONLY (OUTPATIENT)
Dept: PRIMARY CARE CLINIC | Age: 82
End: 2022-02-24
Payer: MEDICARE

## 2022-02-24 DIAGNOSIS — Z12.11 SCREEN FOR COLON CANCER: Primary | ICD-10-CM

## 2022-02-24 LAB
CONTROL: NORMAL
HEMOCCULT STL QL: NORMAL

## 2022-02-24 PROCEDURE — 82274 ASSAY TEST FOR BLOOD FECAL: CPT | Performed by: INTERNAL MEDICINE

## 2022-04-07 RX ORDER — ROSUVASTATIN CALCIUM 5 MG/1
5 TABLET, COATED ORAL DAILY
Qty: 90 TABLET | Refills: 3 | Status: SHIPPED
Start: 2022-04-07 | End: 2022-04-11 | Stop reason: SDUPTHER

## 2022-04-11 DIAGNOSIS — E78.2 MIXED HYPERLIPIDEMIA: Primary | ICD-10-CM

## 2022-04-11 RX ORDER — ROSUVASTATIN CALCIUM 5 MG/1
5 TABLET, COATED ORAL DAILY
Qty: 90 TABLET | Refills: 3 | Status: SHIPPED
Start: 2022-04-11 | End: 2022-04-21 | Stop reason: SDUPTHER

## 2022-04-21 DIAGNOSIS — E78.2 MIXED HYPERLIPIDEMIA: ICD-10-CM

## 2022-04-21 RX ORDER — ROSUVASTATIN CALCIUM 5 MG/1
5 TABLET, COATED ORAL DAILY
Qty: 90 TABLET | Refills: 3 | Status: SHIPPED | OUTPATIENT
Start: 2022-04-21

## 2022-06-29 DIAGNOSIS — I71.40 ABDOMINAL AORTIC ANEURYSM WITHOUT RUPTURE: ICD-10-CM

## 2022-06-29 DIAGNOSIS — I73.9 PERIPHERAL VASCULAR DISEASE, UNSPECIFIED (HCC): Primary | ICD-10-CM

## 2022-07-13 ENCOUNTER — TELEPHONE (OUTPATIENT)
Dept: VASCULAR SURGERY | Age: 82
End: 2022-07-13

## 2022-07-13 NOTE — TELEPHONE ENCOUNTER
Called to confirm appointment for 7/14/22 at 9 a.m, left message with date, time, and phone number for patient.

## 2022-07-14 ENCOUNTER — HOSPITAL ENCOUNTER (OUTPATIENT)
Dept: CARDIOLOGY | Age: 82
Discharge: HOME | End: 2022-07-14
Payer: MEDICARE

## 2022-07-14 ENCOUNTER — OFFICE VISIT (OUTPATIENT)
Dept: VASCULAR SURGERY | Age: 82
End: 2022-07-14
Payer: MEDICARE

## 2022-07-14 VITALS — BODY MASS INDEX: 29.35 KG/M2 | HEIGHT: 70 IN | WEIGHT: 205 LBS

## 2022-07-14 DIAGNOSIS — I73.9 PERIPHERAL VASCULAR DISEASE, UNSPECIFIED (HCC): ICD-10-CM

## 2022-07-14 DIAGNOSIS — Z95.828 S/P FEMORAL-POPLITEAL BYPASS SURGERY: Primary | ICD-10-CM

## 2022-07-14 DIAGNOSIS — I71.40 ABDOMINAL AORTIC ANEURYSM WITHOUT RUPTURE: ICD-10-CM

## 2022-07-14 DIAGNOSIS — I72.4 ANEURYSM OF RIGHT POPLITEAL ARTERY (HCC): ICD-10-CM

## 2022-07-14 PROCEDURE — 93926 LOWER EXTREMITY STUDY: CPT

## 2022-07-14 PROCEDURE — 99214 OFFICE O/P EST MOD 30 MIN: CPT | Performed by: SURGERY

## 2022-07-14 PROCEDURE — 1123F ACP DISCUSS/DSCN MKR DOCD: CPT | Performed by: SURGERY

## 2022-07-14 PROCEDURE — 93923 UPR/LXTR ART STDY 3+ LVLS: CPT

## 2022-07-14 PROCEDURE — 93978 VASCULAR STUDY: CPT

## 2022-07-14 NOTE — PROGRESS NOTES
Chief Complaint:   Chief Complaint   Patient presents with    Check-Up     aaa         HPI: Patient came to the office, for the evaluation of multiple vascular issues, history of abdominal aortic rhythm, right popliteal artery disease, status post left femoropopliteal bypasses overall doing well    Patient does have history of atrial fibrillation, currently on anticoagulation    Patient trying to lose some weight    Patient denies any focal lateralizing neurological symptoms like loss of speech, vision or loss of function of extremity    Patient can walk a few blocks , and denies any symptoms of rest pain    Allergies   Allergen Reactions    Diltiazem Hcl      Bradycardia         Current Outpatient Medications   Medication Sig Dispense Refill    rivaroxaban (XARELTO) 20 MG TABS tablet Take 1 tablet by mouth daily (with breakfast) Indications: pt takes with dinner 90 tablet 3    rosuvastatin (CRESTOR) 5 MG tablet Take 1 tablet by mouth daily 90 tablet 3    losartan (COZAAR) 25 MG tablet Take 1 tablet by mouth daily 90 tablet 3    ibuprofen (ADVIL;MOTRIN) 200 MG tablet Take 200 mg by mouth every 6 hours as needed for Pain      celecoxib (CELEBREX) 100 MG capsule Take 100 mg by mouth as needed       aspirin 81 MG EC tablet Take 81 mg by mouth daily       LUMIGAN 0.01 % SOLN Place 1 drop into both eyes daily. No current facility-administered medications for this visit.        Past Medical History:   Diagnosis Date    Atrial fibrillation ([facility]     Atrial fibrillation with RVR ([facility] 9/23/2011    Basal cell cancer     resected from left leg    Current use of long term anticoagulation     Dyslipidemia     Gastric ulcer approx 2015    Hyperlipidemia     Hypertension     Myocardial infarct ([address] 1981    PVD (peripheral vascular disease) with claudication ([facility] 1/9/2014    S/P femoral-popliteal bypass surgery 1/9/2014    Shoulder problem     Tachycardia     Type 2 diabetes mellitus without complication [facility])        Past Surgical History:   Procedure Laterality Date    ARTERIAL ANEURYSM REPAIR      popliteal artery - Dr. Tor Nieves, 2002    CARDIOVERSION  10/19/2011    DCCV    CORONARY ANGIOPLASTY  1988, 1988    CORONARY ARTERY BYPASS GRAFT  2002    ECHO COMPL W DOP COLOR FLOW  2011         UPPER GASTROINTESTINAL ENDOSCOPY         Family History   Problem Relation Age of Onset    Cancer Mother         Esophageal CA    Cancer Father         Lung CA       Social History     Socioeconomic History    Marital status:      Spouse name: Not on file    Number of children: Not on file    Years of education: Not on file    Highest education level: Not on file   Occupational History    Not on file   Tobacco Use    Smoking status: Former Smoker     Packs/day: 2.00     Years: 20.00     Pack years: 40.00     Types: Cigarettes     Quit date: 9/3/1980     Years since quittin.8    Smokeless tobacco: Never Used   Vaping Use    Vaping Use: Never used   Substance and Sexual Activity    Alcohol use: Yes     Alcohol/week: 7.0 standard drinks     Types: 5 Glasses of wine, 2 Cans of beer per week     Comment: Wine a few glasses a week and beer    Drug use: No    Sexual activity: Not on file   Other Topics Concern    Not on file   Social History Narrative    Not on file     Social Determinants of Health     Financial Resource Strain: Low Risk     Difficulty of Paying Living Expenses: Not hard at all   Food Insecurity: No Food Insecurity    Worried About Running Out of Food in the Last Year: Never true    Kavita of Food in the Last Year: Never true   Transportation Needs:     Lack of Transportation (Medical): Not on file    Lack of Transportation (Non-Medical):  Not on file   Physical Activity:     Days of Exercise per Week: Not on file    Minutes of Exercise per Session: Not on file   Stress:     Feeling of Stress : Not on file   Social Connections:     Frequency of Communication with Friends and Family: Not on file    Frequency of Social Gatherings with Friends and Family: Not on file    Attends [employer] Services: Not on file    Active Member of Clubs or Organizations: Not on file    Attends Club or Organization Meetings: Not on file    Marital Status: Not on file   Intimate Partner Violence:     Fear of Current or Ex-Partner: Not on file    Emotionally Abused: Not on file    Physically Abused: Not on file    Sexually Abused: Not on file   Housing Stability:     Unable to Pay for Housing in the Last Year: Not on file    Number of [place] in the Last Year: Not on file    Unstable Housing in the Last Year: Not on file       Review of Systems:    Eyes:  No blurring, diplopia or vision loss. Respiratory:  No cough, pleuritic chest pain, dyspnea, or wheezing. Cardiovascular: No angina, palpitations . Coronary artery disease, with a history of atrial fibrillation, hypertension, hyperlipidemia  Musculoskeletal:  No arthritis or weakness. Neurologic:  No paralysis, paresis, paresthesia, seizures or headache. Endocrinology:           Physical Exam:  General appearance:  Alert, awake, oriented x 3. No distress. Eyes:  Conjunctivae appear normal; PERRL  Neck:  No jugular venous distention, lymphadenopathy or thyromegaly. No evidence of carotid bruit  Lungs:  Clear to ausculation bilaterally. No rhonchi, crackles, wheezes  Heart:  Regular rate and rhythm. No rub or murmur  Abdomen:  Soft, non-tender. No masses, organomegaly. Prominent pulse noted on deep palpation  Musculoskeletal : No joint effusions, tenderness swelling    Neuro: Speech is intact. Moving all extremities. No focal motor or sensory deficits      Extremities:  Both feet are warm to touch.  The color of both feet is normal.        Pulses Right  Left    Brachial 3 3    Radial    3=normal   Femoral 2 2  2=diminished   Popliteal 2-3 2  1=barely palpable

## 2022-07-14 NOTE — PROGRESS NOTES
[facility] - [facility]    This is a pre read worksheet - prior to official physician interpretation    Holly Hackett  1940  Date of study: 7/14/22    Indication for study:  AAA  Study :  Aortic Ultrasound    Diameter Aorta:     Proximal:   cm     Mid:   cm     Distal:  3.2 x 3.2 cm     Right iliac: 2.2 x 2.2 cm     Left iliac: 2.0 x 2.0 cm    Additional comments:       LAST STUDY  7/1/2021  3.2 cm  Rt 2.2  Lt 2.0

## 2022-07-21 NOTE — PROCEDURES
[address]                  [address] [facility],  [facility]                                VASCULAR REPORT    PATIENT NAME: Susi Daily                         :        1940  MED REC NO:   [id number]                            ROOM:  ACCOUNT NO:   [de-identified]                           ADMIT DATE: 2022  PROVIDER:     Sergei Sher MD    DATE OF PROCEDURE:  2022    PROCEDURE:  Lower extremity arterial Doppler study. INDICATION:  Status post femoral popliteal bypass surgery on the left  side. FINDINGS:  The lower extremity arterial Doppler study was normal with  normal triphasic ankle Doppler tracings and good arterial flow to both  feet based upon the pulse volume recordings of the metatarsals.         Osman Pinto MD    D: 2022 8:01:15       T: 2022 8:31:58     [email or web address]  Job#: [id number]     Doc#: [id number]    CC:

## 2022-07-21 NOTE — PROCEDURES
[address]                  [address] [facility],  [facility]                                VASCULAR REPORT    PATIENT NAME: Jonas Sutherland                         :        1940  MED REC NO:   [id number]                            ROOM:  ACCOUNT NO:   [de-identified]                           ADMIT DATE: 2022  PROVIDER:     Tana Allen MD    DATE OF PROCEDURE:  2022    ARTERIAL DUPLEX STUDY OF THE RIGHT POPLITEAL ARTERY    INDICATION:  History of right popliteal artery aneurysm. FINDINGS:  Duplex scan of the right popliteal artery revealed evidence  of small aneurysm, 1.7 x 1.8 cm, unchanged from last year.         Marie Pisano MD    D: 2022 8:00:45       T: 2022 9:06:31     [email or web address]  Job#: [id number]     Doc#: [id number]

## 2022-07-21 NOTE — PROCEDURES
[address]                  [address] [facility],  [facility]                                VASCULAR REPORT    PATIENT NAME: Alonso Dennis                         :        1940  MED REC NO:   [id number]                            ROOM:  ACCOUNT NO:   [de-identified]                           ADMIT DATE: 2022  PROVIDER:     Vivi Maynard MD    DATE OF PROCEDURE:  2022    ULTRASOUND OF THE ABDOMINAL AORTA    INDICATION:  History of abdominal aortic aneurysm. FINDINGS:  Duplex scanning of the abdominal aorta revealed dilatation of  the aorta, 3.2 x 3.2 cm with the dilatation of both iliac arteries of 2  cm. IMPRESSION:  Abdominal aortic aneurysm with maximum diameter of 3.2 cm  and bilateral iliac artery dilatation of 2.2 cm, unchanged from the last  year.         Vinay Canela MD    D: 2022 8:00:11       T: 2022 9:02:01     [email or web address]  Job#: [id number]     Doc#: [id number]

## 2022-08-10 ENCOUNTER — OFFICE VISIT (OUTPATIENT)
Dept: CARDIOLOGY CLINIC | Age: 82
End: 2022-08-10
Payer: MEDICARE

## 2022-08-10 VITALS
SYSTOLIC BLOOD PRESSURE: 128 MMHG | DIASTOLIC BLOOD PRESSURE: 78 MMHG | HEIGHT: 70 IN | BODY MASS INDEX: 29.84 KG/M2 | WEIGHT: 208.4 LBS | HEART RATE: 65 BPM | RESPIRATION RATE: 18 BRPM

## 2022-08-10 DIAGNOSIS — Z79.01 CHRONIC ANTICOAGULATION: ICD-10-CM

## 2022-08-10 DIAGNOSIS — I10 ESSENTIAL HYPERTENSION: ICD-10-CM

## 2022-08-10 DIAGNOSIS — I48.21 PERMANENT ATRIAL FIBRILLATION (HCC): ICD-10-CM

## 2022-08-10 DIAGNOSIS — I25.10 CORONARY ARTERY DISEASE INVOLVING NATIVE CORONARY ARTERY OF NATIVE HEART WITHOUT ANGINA PECTORIS: Primary | ICD-10-CM

## 2022-08-10 PROCEDURE — 1123F ACP DISCUSS/DSCN MKR DOCD: CPT | Performed by: INTERNAL MEDICINE

## 2022-08-10 PROCEDURE — 99213 OFFICE O/P EST LOW 20 MIN: CPT | Performed by: INTERNAL MEDICINE

## 2022-08-10 PROCEDURE — 93000 ELECTROCARDIOGRAM COMPLETE: CPT | Performed by: INTERNAL MEDICINE

## 2022-08-10 NOTE — PROGRESS NOTES
aspirin 81 MG EC tablet Take 81 mg by mouth daily       LUMIGAN 0.01 % SOLN Place 1 drop into both eyes daily. No current facility-administered medications for this visit. Physical Exam:  /78   Pulse 65   Resp 18   Ht 5' 10\" (1.778 m)   Wt 208 lb 6.4 oz (94.5 kg)   BMI 29.90 kg/m²   Wt Readings from Last 3 Encounters:   08/10/22 208 lb 6.4 oz (94.5 kg)   07/14/22 205 lb (93 kg)   02/22/22 208 lb (94.3 kg)     Appearance: Awake, alert and oriented x 3, no acute respiratory distress  Skin: Intact, no rash  Head: Normocephalic, atraumatic  Eyes: EOMI, no conjunctival erythema  ENMT: No pharyngeal erythema, MMM, no rhinorrhea  Neck: Supple, no carotid bruits  Lungs: Clear to auscultation bilaterally. No wheezes, rales, or rhonchi.   Cardiac: IRRR, no murmurs apparent  Abdomen: Soft, nontender, +bowel sounds  Extremities: Moves all extremities x 4, no lower extremity edema  Neurologic: No focal motor deficits apparent, normal mood and affect, alert and oriented x 3    Laboratory Tests:  Lab Results   Component Value Date    CREATININE 0.9 01/24/2022    BUN 23 01/24/2022     01/24/2022    K 4.8 01/24/2022     01/24/2022    CO2 23 01/24/2022     No results found for: MG  Lab Results   Component Value Date    WBC 5.2 01/24/2022    HGB 16.0 01/24/2022    HCT 47.9 01/24/2022    MCV 99.8 01/24/2022     01/24/2022     Lab Results   Component Value Date    ALT 38 01/24/2022    AST 28 01/24/2022    ALKPHOS 74 01/24/2022    BILITOT 0.6 01/24/2022     Lab Results   Component Value Date    INR 1.5 10/11/2011    INR 1.3 09/24/2011    PROTIME 12.9 (H) 10/11/2011    PROTIME 12.8 (H) 09/24/2011     Lab Results   Component Value Date    TSH 3.350 01/24/2022     Lab Results   Component Value Date    LABA1C 6.1 02/22/2022     No results found for: EAG  Lab Results   Component Value Date    CHOL 153 01/24/2022    CHOL 143 07/13/2021    CHOL 141 01/12/2021     Lab Results   Component Value Date TRIG 112 01/24/2022    TRIG 100 07/13/2021    TRIG 75 01/12/2021     Lab Results   Component Value Date    HDL 48 01/24/2022    HDL 47 07/13/2021    HDL 56 01/12/2021     Lab Results   Component Value Date    LDLCALC 83 01/24/2022    LDLCALC 76 07/13/2021    LDLCALC 70 01/12/2021     Lab Results   Component Value Date    LABVLDL 22 01/24/2022    LABVLDL 20 07/13/2021    LABVLDL 15 01/12/2021     No results found for: CHOLHDLRATIO  No results for input(s): PROBNP in the last 72 hours. Cardiac Tests:  ECG: atrial fibrillation, rate 65, NSSTT changes    Echocardiogram: 10/22/18 (Dr. Jessica Gray)   Left ventricular internal dimensions are normal.   Left ventricular wall thickness is moderately hypertrophied. The left ventricle basal inferior wall appears akinetic. Overall left ventricular systolic function is mildly impaired. The ejection fraction is estimated to be 47% by the biplane method of disks. Diastole could not be fully assessed. The left atrium is severely enlarged as determined by the left atrial volume index. The right atrium appears to be enlarged. Normal mitral leaflet structure and coaptation with no more than mild, centrally directed, mitral insufficiency. The aortic valve appears mildly sclerotic. Mild aortic regurgitation is noted. Exercise nuclear stress test: 10/10/18  1. Exercise EKG was negative. 2. The patient experienced no chest pain with exercise. 3. The myocardial perfusion imaging was normal with attenuation artifact   4. Overall left ventricular systolic function was abnormal without regional wall motion abnormalities. 5. Parham treadmill score was 7 implying low risk. 6. Exercise capacity was above average. 7. Intermediate risk general exercise treadmill test. Due to LV dysfunction. 8. Gated SPECT left ventricular ejection fraction was calculated to be 43%, with hypokinesis of the global wall. ASSESSMENT / PLAN:   Acute inferior wall MI, 10/1981.  Medical seconds, [facility], 10/19/2011. OP evaluation, 03/15/2012. AF, ventricular response 76 per minute. Amiodarone therapy initiated as an OP, initially 200 mg bid followed by 200 mg qd. PUD, without symptoms, but with heme positive stools noted 10/2014. NSAID's including aspirin stopped. GI work up 2015 showed a small peptic ulcer and several small polyps, all of which were treated appropriately. Recurrent atrial fibrillation documented mid October, 2017. Patient underwent elective cardioversion, 11/03/2017 (Dr. Per Jordan), but he reverted back to atrial fibrillation in 2 weeks. Second opinion by Electrophysiology at [facility], 12/20/2018. Recommended no further attempts at rhythm control and also recommended that amiodarone be stopped.       - BP today 128/78; BP at last office visit 126/64  - Atrial fibrillation at rate 65 (AF at rate 63 on prior EKG)  - Continue home BP monitoring (BP controlled)  - Continue current medications (including ARB and xarelto)  - Prior cardiac studies reviewed    Vinnie Whitehead MD  [facility]) Cardiology

## 2022-08-29 DIAGNOSIS — Z79.01 ANTICOAGULATED: ICD-10-CM

## 2022-08-29 DIAGNOSIS — I10 ESSENTIAL HYPERTENSION: ICD-10-CM

## 2022-08-29 DIAGNOSIS — E11.51 TYPE 2 DIABETES MELLITUS WITH DIABETIC PERIPHERAL ANGIOPATHY WITHOUT GANGRENE, WITHOUT LONG-TERM CURRENT USE OF INSULIN (HCC): ICD-10-CM

## 2022-08-29 DIAGNOSIS — E78.2 MIXED HYPERLIPIDEMIA: ICD-10-CM

## 2022-08-29 LAB
ALBUMIN SERPL-MCNC: 4.2 G/DL (ref 3.5–5.2)
ALP BLD-CCNC: 72 U/L (ref 40–129)
ALT SERPL-CCNC: 38 U/L (ref 0–40)
ANION GAP SERPL CALCULATED.3IONS-SCNC: 17 MMOL/L (ref 7–16)
AST SERPL-CCNC: 27 U/L (ref 0–39)
BILIRUB SERPL-MCNC: 0.7 MG/DL (ref 0–1.2)
BUN BLDV-MCNC: 20 MG/DL (ref 6–23)
CALCIUM SERPL-MCNC: 9.8 MG/DL (ref 8.6–10.2)
CHLORIDE BLD-SCNC: 107 MMOL/L (ref 98–107)
CHOLESTEROL, TOTAL: 148 MG/DL (ref 0–199)
CO2: 20 MMOL/L (ref 22–29)
CREAT SERPL-MCNC: 1 MG/DL (ref 0.7–1.2)
GFR AFRICAN AMERICAN: >60
GFR NON-AFRICAN AMERICAN: >60 ML/MIN/1.73
GLUCOSE BLD-MCNC: 109 MG/DL (ref 74–99)
HBA1C MFR BLD: 7 % (ref 4–5.6)
HCT VFR BLD CALC: 44.7 % (ref 37–54)
HDLC SERPL-MCNC: 45 MG/DL
HEMOGLOBIN: 15 G/DL (ref 12.5–16.5)
LDL CHOLESTEROL CALCULATED: 79 MG/DL (ref 0–99)
MCH RBC QN AUTO: 33.7 PG (ref 26–35)
MCHC RBC AUTO-ENTMCNC: 33.6 % (ref 32–34.5)
MCV RBC AUTO: 100.4 FL (ref 80–99.9)
PDW BLD-RTO: 13 FL (ref 11.5–15)
PLATELET # BLD: 174 E9/L (ref 130–450)
PMV BLD AUTO: 11 FL (ref 7–12)
POTASSIUM SERPL-SCNC: 4.7 MMOL/L (ref 3.5–5)
RBC # BLD: 4.45 E12/L (ref 3.8–5.8)
SODIUM BLD-SCNC: 144 MMOL/L (ref 132–146)
TOTAL PROTEIN: 7.4 G/DL (ref 6.4–8.3)
TRIGL SERPL-MCNC: 120 MG/DL (ref 0–149)
VLDLC SERPL CALC-MCNC: 24 MG/DL
WBC # BLD: 6 E9/L (ref 4.5–11.5)

## 2022-09-04 SDOH — HEALTH STABILITY: PHYSICAL HEALTH: ON AVERAGE, HOW MANY DAYS PER WEEK DO YOU ENGAGE IN MODERATE TO STRENUOUS EXERCISE (LIKE A BRISK WALK)?: 5 DAYS

## 2022-09-04 SDOH — HEALTH STABILITY: PHYSICAL HEALTH: ON AVERAGE, HOW MANY MINUTES DO YOU ENGAGE IN EXERCISE AT THIS LEVEL?: 70 MIN

## 2022-09-04 ASSESSMENT — LIFESTYLE VARIABLES
HOW OFTEN DO YOU HAVE A DRINK CONTAINING ALCOHOL: 2-3 TIMES A WEEK
HOW MANY STANDARD DRINKS CONTAINING ALCOHOL DO YOU HAVE ON A TYPICAL DAY: 1 OR 2
HOW OFTEN DO YOU HAVE A DRINK CONTAINING ALCOHOL: 4
HOW OFTEN DO YOU HAVE SIX OR MORE DRINKS ON ONE OCCASION: 1
HOW MANY STANDARD DRINKS CONTAINING ALCOHOL DO YOU HAVE ON A TYPICAL DAY: 1

## 2022-09-04 ASSESSMENT — PATIENT HEALTH QUESTIONNAIRE - PHQ9
SUM OF ALL RESPONSES TO PHQ QUESTIONS 1-9: 0
SUM OF ALL RESPONSES TO PHQ QUESTIONS 1-9: 0
SUM OF ALL RESPONSES TO PHQ9 QUESTIONS 1 & 2: 0
1. LITTLE INTEREST OR PLEASURE IN DOING THINGS: 0
SUM OF ALL RESPONSES TO PHQ QUESTIONS 1-9: 0
SUM OF ALL RESPONSES TO PHQ QUESTIONS 1-9: 0
2. FEELING DOWN, DEPRESSED OR HOPELESS: 0

## 2022-09-06 ENCOUNTER — OFFICE VISIT (OUTPATIENT)
Dept: PRIMARY CARE CLINIC | Age: 82
End: 2022-09-06
Payer: MEDICARE

## 2022-09-06 VITALS
HEART RATE: 73 BPM | BODY MASS INDEX: 29.63 KG/M2 | OXYGEN SATURATION: 98 % | WEIGHT: 207 LBS | DIASTOLIC BLOOD PRESSURE: 68 MMHG | TEMPERATURE: 97.1 F | RESPIRATION RATE: 16 BRPM | SYSTOLIC BLOOD PRESSURE: 122 MMHG | HEIGHT: 70 IN

## 2022-09-06 DIAGNOSIS — I10 ESSENTIAL HYPERTENSION: ICD-10-CM

## 2022-09-06 DIAGNOSIS — I72.4 ANEURYSM OF RIGHT POPLITEAL ARTERY (HCC): ICD-10-CM

## 2022-09-06 DIAGNOSIS — Z00.00 MEDICARE ANNUAL WELLNESS VISIT, SUBSEQUENT: Primary | ICD-10-CM

## 2022-09-06 DIAGNOSIS — I71.40 ABDOMINAL AORTIC ANEURYSM WITHOUT RUPTURE: ICD-10-CM

## 2022-09-06 DIAGNOSIS — E11.51 TYPE 2 DIABETES MELLITUS WITH DIABETIC PERIPHERAL ANGIOPATHY WITHOUT GANGRENE, WITHOUT LONG-TERM CURRENT USE OF INSULIN (HCC): ICD-10-CM

## 2022-09-06 DIAGNOSIS — I48.21 PERMANENT ATRIAL FIBRILLATION (HCC): ICD-10-CM

## 2022-09-06 DIAGNOSIS — H61.21 IMPACTED CERUMEN OF RIGHT EAR: ICD-10-CM

## 2022-09-06 DIAGNOSIS — E78.2 MIXED HYPERLIPIDEMIA: ICD-10-CM

## 2022-09-06 DIAGNOSIS — I70.209 ATHEROSCLEROTIC PERIPHERAL VASCULAR DISEASE (HCC): ICD-10-CM

## 2022-09-06 DIAGNOSIS — Z79.01 ANTICOAGULATED: ICD-10-CM

## 2022-09-06 DIAGNOSIS — I48.0 PAROXYSMAL ATRIAL FIBRILLATION (HCC): Chronic | ICD-10-CM

## 2022-09-06 DIAGNOSIS — H40.9 GLAUCOMA OF BOTH EYES, UNSPECIFIED GLAUCOMA TYPE: ICD-10-CM

## 2022-09-06 PROCEDURE — 3051F HG A1C>EQUAL 7.0%<8.0%: CPT | Performed by: INTERNAL MEDICINE

## 2022-09-06 PROCEDURE — 1123F ACP DISCUSS/DSCN MKR DOCD: CPT | Performed by: INTERNAL MEDICINE

## 2022-09-06 PROCEDURE — G0439 PPPS, SUBSEQ VISIT: HCPCS | Performed by: INTERNAL MEDICINE

## 2022-09-06 RX ORDER — BIMATOPROST 0.01 %
1 DROPS OPHTHALMIC (EYE) DAILY
Qty: 7.5 ML | Refills: 2 | Status: SHIPPED | OUTPATIENT
Start: 2022-09-06

## 2022-09-06 SDOH — ECONOMIC STABILITY: FOOD INSECURITY: WITHIN THE PAST 12 MONTHS, YOU WORRIED THAT YOUR FOOD WOULD RUN OUT BEFORE YOU GOT MONEY TO BUY MORE.: NEVER TRUE

## 2022-09-06 SDOH — ECONOMIC STABILITY: FOOD INSECURITY: WITHIN THE PAST 12 MONTHS, THE FOOD YOU BOUGHT JUST DIDN'T LAST AND YOU DIDN'T HAVE MONEY TO GET MORE.: NEVER TRUE

## 2022-09-06 ASSESSMENT — SOCIAL DETERMINANTS OF HEALTH (SDOH): HOW HARD IS IT FOR YOU TO PAY FOR THE VERY BASICS LIKE FOOD, HOUSING, MEDICAL CARE, AND HEATING?: NOT HARD AT ALL

## 2022-09-06 NOTE — PROGRESS NOTES
Medicare Annual Wellness Visit    Austin Calero is here for Medicare AWV (subsequent)    Assessment & Plan   Medicare annual wellness visit, subsequent  Coronary artery disease, currently stable and followed by his cardiologist.  -     rivaroxaban (XARELTO) 20 MG TABS tablet; Take 1 tablet by mouth daily (with breakfast) Indications: pt takes with dinner, Disp-90 tablet, R-3Print  -     bimatoprost (LUMIGAN) 0.01 % SOLN ophthalmic drops; Place 1 drop into both eyes daily, Disp-7.5 mL, R-2Print    Hyperlipidemia, well controlled on current dose of rosuvastatin 5 mg daily. -     bimatoprost (LUMIGAN) 0.01 % SOLN ophthalmic drops; Place 1 drop into both eyes daily, Disp-7.5 mL, R-2Print    Essential hypertension, well controlled on current meds. Type 2 diabetes mellitus with diabetic peripheral angiopathy without gangrene, without long-term current use of insulin ([address] borderline control with a hemoglobin A1c 7.0%, mainly due to dietary noncompliance. Permanent atrial fibrillation ([address] rate controlled. Atherosclerotic peripheral vascular disease ([address] stable and followed by vascular surgery  Abdominal aortic aneurysm without rupture ([address] stable and followed by vascular surgery  Aneurysm of right popliteal artery ([address] status postrepair and followed by vascular surgery. Anticoagulated on Xarelto with no reported bleeding issues. Recommendations for Preventive Services Due: see orders and patient instructions/AVS.  Recommended screening schedule for the next 5-10 years is provided to the patient in written form: see Patient Instructions/AVS.     Return for Medicare Annual Wellness Visit in 1 year. Subjective   Patient comes in for his annual wellness visit. He is now 79years of age. Currently feels well. He remains physically active and golfs on a regular basis. He does his own yard work.     Patient's complete Health Risk Assessment and screening values have been reviewed and are found in Flowsheets. The following problems were reviewed today and where indicated follow up appointments were made and/or referrals ordered. Positive Risk Factor Screenings with Interventions:             General Health and ACP:  General  In general, how would you say your health is?: Very Good  In the past 7 days, have you experienced any of the following: New or Increased Pain, New or Increased Fatigue, Loneliness, Social Isolation, Stress or Anger?: No  Do you get the social and emotional support that you need?: Yes  Do you have a Living Will?: Yes    Advance Directives       Power of  Living Will ACP-Advance Directive ACP-Power of     Not on File Not on File Not on File Not on File        General Health Risk Interventions:  No additional health risk interventions. Hearing/Vision:  Do you or your family notice any trouble with your hearing that hasn't been managed with hearing aids?: (!) Yes  Do you have difficulty driving, watching TV, or doing any of your daily activities because of your eyesight?: No  Have you had an eye exam within the past year?: Yes  No results found. Hearing/Vision Interventions:  Is advised get a hearing evaluation and an annual eye exam.    Safety:  Do you have working smoke detectors?: Yes  Do you have any tripping hazards - loose or unsecured carpets or rugs?: (!) Yes  Do you have any tripping hazards - clutter in doorways, halls, or stairs?: (!) Yes  Do you have either shower bars, grab bars, non-slip mats or non-slip surfaces in your shower or bathtub?: (!) No  Do all of your stairways have a railing or banister?: Yes  Do you always fasten your seatbelt when you are in a car?: Yes  Safety Interventions:  Home safety tips provided           Objective   Vitals:    09/06/22 0922   BP: 122/68   Pulse: 73   Resp: 16   Temp: 97.1 °F (36.2 °C)   SpO2: 98%   Weight: 207 lb (93.9 kg)   Height: 5' 10\" (1.778 m)      Body mass index is 29.7 kg/m².       Exam: Patient appears in no distress. He is alert and oriented and breathing comfortably. HEENT normal except for bilateral hearing aids and wax impaction right ear. Neck supple adenopathy or bruits. Heart regular rhythm no murmurs gallops or rubs. Lungs clear. Abdomen normal.  Extremities no edema. Peripheral pulses normal.  No neurologic deficits noted. Allergies   Allergen Reactions    Diltiazem Hcl      Bradycardia       Prior to Visit Medications    Medication Sig Taking?  Authorizing Provider   rivaroxaban (XARELTO) 20 MG TABS tablet Take 1 tablet by mouth daily (with breakfast) Indications: pt takes with dinner Yes Ricardo Man MD   bimatoprost (LUMIGAN) 0.01 % SOLN ophthalmic drops Place 1 drop into both eyes daily Yes Ricardo Man MD   rosuvastatin (CRESTOR) 5 MG tablet Take 1 tablet by mouth daily Yes Ricardo Man MD   losartan (COZAAR) 25 MG tablet Take 1 tablet by mouth daily Yes Ricardo Man MD   ibuprofen (ADVIL;MOTRIN) 200 MG tablet Take 200 mg by mouth every 6 hours as needed for Pain Yes Historical Provider, MD   celecoxib (CELEBREX) 100 MG capsule Take 100 mg by mouth as needed  Yes Historical Provider, MD   aspirin 81 MG EC tablet Take 81 mg by mouth daily  Yes Historical Provider, MD Grier (Including outside providers/suppliers regularly involved in providing care):   Patient Care Team:  Ricardo Man MD as PCP - General (Internal Medicine)  Ricardo Man MD as PCP - [facility]  Geovanny Pablo MD as Surgeon (Vascular Surgery)  Kirsty Stuart MD (Specialist)  Nahomy Power MD as Surgeon (Colon and Rectal Surgery)  María Nowak MD as Consulting Physician (Cardiology)  Prasad Brand MD as Surgeon (Orthopedic Surgery)     Reviewed and updated this visit:  Tobacco  Allergies  Meds  Med Hx  Surg Hx  Soc Hx  Fam Hx      Discussion: He will continue all of his usual meds and supplements the same as listed on his med list.  He will follow-up with his cardiologist as per his instructions. We will refer him to ENT for removal of cerumen impaction right ear. He is encouraged to try to maintain a low-cholesterol, heart healthy diet and exercise on a regular basis as tolerated. Otherwise he will return as needed or in 6 months for follow-up visit and routine labs.

## 2022-09-08 ENCOUNTER — NURSE ONLY (OUTPATIENT)
Dept: PRIMARY CARE CLINIC | Age: 82
End: 2022-09-08
Payer: MEDICARE

## 2022-09-08 DIAGNOSIS — Z23 ENCOUNTER FOR IMMUNIZATION: Primary | ICD-10-CM

## 2022-09-08 PROCEDURE — 99999 PR OFFICE/OUTPT VISIT,PROCEDURE ONLY: CPT | Performed by: INTERNAL MEDICINE

## 2022-09-08 PROCEDURE — G0008 ADMIN INFLUENZA VIRUS VAC: HCPCS | Performed by: INTERNAL MEDICINE

## 2022-09-08 PROCEDURE — 90694 VACC AIIV4 NO PRSRV 0.5ML IM: CPT | Performed by: INTERNAL MEDICINE

## 2022-09-19 DIAGNOSIS — I10 ESSENTIAL HYPERTENSION: ICD-10-CM

## 2022-09-19 RX ORDER — LOSARTAN POTASSIUM 25 MG/1
25 TABLET ORAL DAILY
Qty: 90 TABLET | Refills: 3 | Status: SHIPPED | OUTPATIENT
Start: 2022-09-19

## 2022-10-27 ENCOUNTER — OFFICE VISIT (OUTPATIENT)
Dept: PRIMARY CARE CLINIC | Age: 82
End: 2022-10-27
Payer: MEDICARE

## 2022-10-27 VITALS
OXYGEN SATURATION: 98 % | HEIGHT: 70 IN | HEART RATE: 71 BPM | BODY MASS INDEX: 29.2 KG/M2 | RESPIRATION RATE: 16 BRPM | TEMPERATURE: 97.7 F | WEIGHT: 204 LBS | DIASTOLIC BLOOD PRESSURE: 70 MMHG | SYSTOLIC BLOOD PRESSURE: 120 MMHG

## 2022-10-27 DIAGNOSIS — M25.562 ACUTE PAIN OF LEFT KNEE: Primary | ICD-10-CM

## 2022-10-27 PROCEDURE — 3074F SYST BP LT 130 MM HG: CPT | Performed by: INTERNAL MEDICINE

## 2022-10-27 PROCEDURE — 99213 OFFICE O/P EST LOW 20 MIN: CPT | Performed by: INTERNAL MEDICINE

## 2022-10-27 PROCEDURE — 3078F DIAST BP <80 MM HG: CPT | Performed by: INTERNAL MEDICINE

## 2022-10-27 PROCEDURE — 1123F ACP DISCUSS/DSCN MKR DOCD: CPT | Performed by: INTERNAL MEDICINE

## 2022-10-27 NOTE — PROGRESS NOTES
Terrence Back  10/27/22     Chief Complaint   Patient presents with    Knee Pain     Left         Allergies   Allergen Reactions    Diltiazem Hcl      Bradycardia          Current Outpatient Medications   Medication Sig Dispense Refill    losartan (COZAAR) 25 MG tablet Take 1 tablet by mouth daily 90 tablet 3    rivaroxaban (XARELTO) 20 MG TABS tablet Take 1 tablet by mouth daily (with breakfast) Indications: pt takes with dinner 90 tablet 3    bimatoprost (LUMIGAN) 0.01 % SOLN ophthalmic drops Place 1 drop into both eyes daily 7.5 mL 2    rosuvastatin (CRESTOR) 5 MG tablet Take 1 tablet by mouth daily 90 tablet 3    ibuprofen (ADVIL;MOTRIN) 200 MG tablet Take 200 mg by mouth every 6 hours as needed for Pain      celecoxib (CELEBREX) 100 MG capsule Take 100 mg by mouth as needed       aspirin 81 MG EC tablet Take 81 mg by mouth daily        No current facility-administered medications for this visit. HPI: Patient comes in complaining of left knee pain for the past few days after lifting heavy object in his yard. He had similar symptoms couple of years ago requiring a steroid injection. Review of Systems: as per HPI      Physical Exam:    Patient is a 79 y.o. male. Patient appears to be in no distress. Breathing comfortably. Ambulates without assistance. Left knee appears somewhat swollen with possible mild effusion. Pain aggravated on range of motion. Assessment:    Natalie Du was seen today for knee pain. Diagnoses and all orders for this visit:    Acute pain of left knee  -     [address] Trino Robbins DO, Orthopaedic Surgery, [facility] (Triston Wolf)        Discussion Notes: Refer patient to [facility] orthopedic surgery for further evaluation and treatment.

## 2022-10-28 SDOH — HEALTH STABILITY: PHYSICAL HEALTH: ON AVERAGE, HOW MANY DAYS PER WEEK DO YOU ENGAGE IN MODERATE TO STRENUOUS EXERCISE (LIKE A BRISK WALK)?: 5 DAYS

## 2022-10-28 SDOH — HEALTH STABILITY: PHYSICAL HEALTH: ON AVERAGE, HOW MANY MINUTES DO YOU ENGAGE IN EXERCISE AT THIS LEVEL?: 70 MIN

## 2022-10-31 ENCOUNTER — OFFICE VISIT (OUTPATIENT)
Dept: ORTHOPEDIC SURGERY | Age: 82
End: 2022-10-31
Payer: MEDICARE

## 2022-10-31 VITALS — HEIGHT: 70 IN | WEIGHT: 202 LBS | BODY MASS INDEX: 28.92 KG/M2

## 2022-10-31 DIAGNOSIS — M25.562 LEFT KNEE PAIN, UNSPECIFIED CHRONICITY: Primary | ICD-10-CM

## 2022-10-31 DIAGNOSIS — M17.12 PRIMARY OSTEOARTHRITIS OF LEFT KNEE: ICD-10-CM

## 2022-10-31 PROCEDURE — 1123F ACP DISCUSS/DSCN MKR DOCD: CPT | Performed by: STUDENT IN AN ORGANIZED HEALTH CARE EDUCATION/TRAINING PROGRAM

## 2022-10-31 PROCEDURE — 20610 DRAIN/INJ JOINT/BURSA W/O US: CPT | Performed by: STUDENT IN AN ORGANIZED HEALTH CARE EDUCATION/TRAINING PROGRAM

## 2022-10-31 PROCEDURE — 99204 OFFICE O/P NEW MOD 45 MIN: CPT | Performed by: STUDENT IN AN ORGANIZED HEALTH CARE EDUCATION/TRAINING PROGRAM

## 2022-10-31 RX ORDER — TRIAMCINOLONE ACETONIDE 40 MG/ML
80 INJECTION, SUSPENSION INTRA-ARTICULAR; INTRAMUSCULAR ONCE
Status: COMPLETED | OUTPATIENT
Start: 2022-10-31 | End: 2022-10-31

## 2022-10-31 RX ORDER — BUPIVACAINE HYDROCHLORIDE 2.5 MG/ML
3 INJECTION, SOLUTION INFILTRATION; PERINEURAL ONCE
Status: COMPLETED | OUTPATIENT
Start: 2022-10-31 | End: 2022-10-31

## 2022-10-31 RX ADMIN — TRIAMCINOLONE ACETONIDE 80 MG: 40 INJECTION, SUSPENSION INTRA-ARTICULAR; INTRAMUSCULAR at 16:17

## 2022-10-31 RX ADMIN — BUPIVACAINE HYDROCHLORIDE 7.5 MG: 2.5 INJECTION, SOLUTION INFILTRATION; PERINEURAL at 16:17

## 2022-10-31 NOTE — PROGRESS NOTES
New Knee Patient     Referring Provider:   Steff Sanchez MD  [address] [place] [address]    CHIEF COMPLAINT:   Chief Complaint   Patient presents with    Knee Pain     Referred by Dr Russ Lira for left knee pain x 2 weeks. Was lifting a concrete statue at that time. Experienced pain 2 days after lifting statue. HPI:    Hanny Marie is a 79y.o. year old male with left knee pain. He states he was lifting a statue cleaning up his yard and had pain afterwards. The pain is located on the medial side of his knee. Sharp and nonradiating. Is worse activity and better with rest.  He has tried a brace which is helped. He did have a steroid injection over a year ago which caused some blood pressure issues however he thinks that this may be unrelated to his injection.   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 Ondina Olivier, 11/2002    CARDIOVERSION  10/19/2011    DCCV    CORONARY ANGIOPLASTY  7/26/1988, 9/19/1988    CORONARY ARTERY BYPASS GRAFT  11/26/2002    ECHO COMPL W DOP COLOR FLOW  9/24/2011         UPPER GASTROINTESTINAL ENDOSCOPY           FAMILY HISTORY   Family History   Problem Relation Age of Onset    Cancer Mother         Esophageal CA    Cancer Father         Lung CA       SOCIAL · Rate controlled    Continue with metoprolol 100 mg b i d   ·   INR 2 79  ·  Continue Coumadin 6 mg daily HISTORY  Social History     Socioeconomic History    Marital status:      Spouse name: Not on file    Number of children: Not on file    Years of education: Not on file    Highest education level: Not on file   Occupational History    Not on file   Tobacco Use    Smoking status: Former     Packs/day: 2.00     Years: 20.00     Pack years: 40.00     Types: Cigarettes     Quit date: 9/3/1980     Years since quittin.1    Smokeless tobacco: Never   Vaping Use    Vaping Use: Never used   Substance and Sexual Activity    Alcohol use: Yes     Alcohol/week: 7.0 standard drinks     Types: 5 Glasses of wine, 2 Cans of beer per week     Comment: Wine a few glasses a week and beer    Drug use: No    Sexual activity: Not on file   Other Topics Concern    Not on file   Social History Narrative    Not on file     Social Determinants of Health     Financial Resource Strain: Low Risk     Difficulty of Paying Living Expenses: Not hard at all   Food Insecurity: No Food Insecurity    Worried About Running Out of Food in the Last Year: Never true    Ran Out of Food in the Last Year: Never true   Transportation Needs: Not on file   Physical Activity: Sufficiently Active    Days of Exercise per Week: 5 days    Minutes of Exercise per Session: 70 min   Stress: Not on file   Social Connections: Not on file   Intimate Partner Violence: Not At Risk    Fear of Current or Ex-Partner: No    Emotionally Abused: No    Physically Abused: No    Sexually Abused: No   Housing Stability: Not on file     Social History     Occupational History    Not on file   Tobacco Use    Smoking status: Former     Packs/day: 2.00     Years: 20.00     Pack years: 40.00     Types: Cigarettes     Quit date: 9/3/1980     Years since quittin.1    Smokeless tobacco: Never   Vaping Use    Vaping Use: Never used   Substance and Sexual Activity    Alcohol use:  Yes     Alcohol/week: 7.0 standard drinks     Types: 5 Glasses of wine, 2 Cans of beer per week Comment: Wine a few glasses a week and beer    Drug use: No    Sexual activity: Not on file       CURRENT MEDICATIONS     Current Outpatient Medications:     losartan (COZAAR) 25 MG tablet, Take 1 tablet by mouth daily, Disp: 90 tablet, Rfl: 3    rivaroxaban (XARELTO) 20 MG TABS tablet, Take 1 tablet by mouth daily (with breakfast) Indications: pt takes with dinner, Disp: 90 tablet, Rfl: 3    bimatoprost (LUMIGAN) 0.01 % SOLN ophthalmic drops, Place 1 drop into both eyes daily, Disp: 7.5 mL, Rfl: 2    rosuvastatin (CRESTOR) 5 MG tablet, Take 1 tablet by mouth daily, Disp: 90 tablet, Rfl: 3    ibuprofen (ADVIL;MOTRIN) 200 MG tablet, Take 200 mg by mouth every 6 hours as needed for Pain, Disp: , Rfl:     celecoxib (CELEBREX) 100 MG capsule, Take 100 mg by mouth as needed , Disp: , Rfl:     aspirin 81 MG EC tablet, Take 81 mg by mouth daily , Disp: , Rfl:     ALLERGIES  Allergies   Allergen Reactions    Diltiazem Hcl      Bradycardia         Controlled Substances Monitoring:          REVIEW OF SYSTEMS:     Constitutional:  Negative for weight loss, fevers, chills, fatigue  Cardiovascular: Negative for chest pain, palpitations  Pulmonary: Negative for shortness of breath, labored breathing, cough  GI: negative for abdominal pain, nausea, vomitting   MSK: per HPI  Skin: negative for rash, open wounds    All other systems reviewed and are negative         PHYSICAL EXAM     Vitals:    10/31/22 1030   Weight: 202 lb (91.6 kg)   Height: 5' 10\" (1.778 m)       Height: 5' 10\" (1.778 m)  Weight: [unfilled]  BMI:  Body mass index is 28.98 kg/m². General: The patient is alert and oriented x 3, appears to be stated age and in no distress. HEENT: head is normocephalic, atraumatic. EOMI. Neck: supple, trachea midline, no thyromegaly   Cardiovascular: peripheral pulses palpable.   Normal Capillary refill   Respiratory: breathing unlabored, chest expansion symmetric   Skin: no rash, no open wounds, no erythema  Psych: normal affect; mood stable  Neurologic: gait normal, sensation grossly intact in extremities  MSK:        Lower Extremity:   Ipsilateral hip exam shows normal range of motion without pain with impingement testing. Left knee: Trace effusion. Tender to palpation of the medial joint line. Range of motion is full from 0 to 130 degrees. Knee stable varus valgus stress at 0 and 30 degrees. Negative Lachman. Negative posterior drawer. IMAGING:    XR: 4 views of the left knee shows medial compartment osteoarthritis with joint space narrowing however this is mild. Trace effusion        ASSESSMENT  Left knee osteoarthritis    PLAN  Conservative treatments because detail with the patient. We again attempt another steroid injection today. He is going to keep an eye on his blood pressure and call family physician if it elevates. All of his questions were answered in detail. He is going to start a knee exercise program at home and wear a compression sleeve for comfort. Use ice and elevation as well. We will see him back in 6 weeks if he is no better. Left knee Steroid Injection    The left knee identified as the injection site. The risk and benefits of a cortisone injection were explained and the patient consented to the injection. Under sterile conditions,the left  knee was injected with a mixture of 80  mg of Kenalog and 3 mL of 0.25% Marcaine without complication. A sterile bandage was applied.        Geovanna Brenner DO  Orthopaedic Surgery   10/31/22   10:34 AM EDT

## 2022-10-31 NOTE — PATIENT INSTRUCTIONS
Knee: Exercises  Introduction  Here are some examples of exercises for you to try. The exercises may be suggested for a condition or for rehabilitation. Start each exercise slowly. Ease off the exercises if you start to have pain. You will be told when to start these exercises and which ones will work bestfor you. How to do the exercises  Quad sets    Sit with your leg straight and supported on the floor or a firm bed. (If you feel discomfort in the front or back of your knee, place a small towel roll under your knee.)  Tighten the muscles on top of your thigh by pressing the back of your knee flat down to the floor. (If you feel discomfort under your kneecap, place a small towel roll under your knee.)  Hold for about 6 seconds, then rest for up to 10 seconds. Do 8 to 12 repetitions several times a day. Straight-leg raises to the front    Lie on your back with your good knee bent so that your foot rests flat on the floor. Your injured leg should be straight. Make sure that your low back has a normal curve. You should be able to slip your flat hand in between the floor and the small of your back, with your palm touching the floor and your back touching the back of your hand. Tighten the thigh muscles in the injured leg by pressing the back of your knee flat down to the floor. Hold your knee straight. Keeping the thigh muscles tight, lift your injured leg up so that your heel is about 12 inches off the floor. Hold for about 6 seconds and then lower slowly. Do 8 to 12 repetitions, 3 times a day. Straight-leg raises to the outside    Lie on your side, with your injured leg on top. Tighten the front thigh muscles of your injured leg to keep your knee straight. Keep your hip and your leg straight in line with the rest of your body, and keep your knee pointing forward. Do not drop your hip back. Lift your injured leg straight up toward the ceiling, about 12 inches off the floor.  Hold for about 6 seconds, then slowly lower your leg. Do 8 to 12 repetitions. Straight-leg raises to the back    Lie on your stomach, and lift your leg straight up behind you (toward the ceiling). Lift your toes about 6 inches off the floor, hold for about 6 seconds, then lower slowly. Do 8 to 12 repetitions. Straight-leg raises to the inside    Lie on the side of your body with the injured leg. You can either prop your other (good) leg up on a chair, or you can bend your good knee and put that foot in front of your injured knee. Do not drop your hip back. Tighten the muscles on the front of your thigh to straighten your injured knee. Keep your kneecap pointing forward, and lift your whole leg up toward the ceiling about 6 inches. Hold for about 6 seconds, then lower slowly. Do 8 to 12 repetitions. Heel dig bridging    Lie on your back with both knees bent and your ankles bent so that only your heels are digging into the floor. Your knees should be bent about 90 degrees. Then push your heels into the floor, squeeze your buttocks, and lift your hips off the floor until your shoulders, hips, and knees are all in a straight line. Hold for about 6 seconds as you continue to breathe normally, and then slowly lower your hips back down to the floor and rest for up to 10 seconds. Do 8 to 12 repetitions. Hamstring curls    Lie on your stomach with your knees straight. If your kneecap is uncomfortable, roll up a washcloth and put it under your leg just above your kneecap. Lift the foot of your injured leg by bending the knee so that you bring the foot up toward your buttock. If this motion hurts, try it without bending your knee quite as far. This may help you avoid any painful motion. Slowly lower your leg back to the floor. Do 8 to 12 repetitions. With permission from your doctor or physical therapist, you may also want to add a cuff weight to your ankle (not more than 5 pounds).  With weight, you do not have to lift your leg more than 12 inches to get a hamstring workout. Shallow standing knee bends    Do this exercise only if you have very little pain; if you have no clicking, locking, or giving way if you have an injured knee; and if it does not hurtwhile you are doing 8 to 12 repetitions. Stand with your hands lightly resting on a counter or chair in front of you. Put your feet shoulder-width apart. Slowly bend your knees so that you squat down like you are going to sit in a chair. Make sure your knees do not go in front of your toes. Lower yourself about 6 inches. Your heels should remain on the floor at all times. Rise slowly to a standing position. Heel raises    Stand with your feet a few inches apart, with your hands lightly resting on a counter or chair in front of you. Slowly raise your heels off the floor while keeping your knees straight. Hold for about 6 seconds, then slowly lower your heels to the floor. Do 8 to 12 repetitions several times during the day. Follow-up care is a key part of your treatment and safety. Be sure to make and go to all appointments, and call your doctor if you are having problems. It's also a good idea to know your test results and keep alist of the medicines you take. Where can you learn more? Go to [email or web address]. org and sign in to your [employer] account. Enter N612 in the [facility] box to learn more about \"Knee: Exercises. \"     If you do not have an account, please click on the \"Sign Up Now\" link. Current as of: March 9, 2022               Content Version: 13.3  © 3582-8697 [employer], Inc[employer]. Care instructions adapted under license by [facility] [place] ([facility]). If you have questions about a medical condition or this instruction, always ask your healthcare professional. [address] any warranty or liability for your use of this information.

## 2022-12-09 ENCOUNTER — TELEPHONE (OUTPATIENT)
Dept: PRIMARY CARE CLINIC | Age: 82
End: 2022-12-09

## 2022-12-09 RX ORDER — CYCLOSPORINE 0.5 MG/ML
1 EMULSION OPHTHALMIC 2 TIMES DAILY
COMMUNITY

## 2022-12-12 ENCOUNTER — OFFICE VISIT (OUTPATIENT)
Dept: ORTHOPEDIC SURGERY | Age: 82
End: 2022-12-12

## 2022-12-12 ENCOUNTER — EVALUATION (OUTPATIENT)
Dept: OCCUPATIONAL THERAPY | Age: 82
End: 2022-12-12

## 2022-12-12 VITALS — WEIGHT: 200 LBS | HEIGHT: 70 IN | BODY MASS INDEX: 28.63 KG/M2

## 2022-12-12 DIAGNOSIS — M79.645 CHRONIC PAIN OF LEFT THUMB: Primary | ICD-10-CM

## 2022-12-12 DIAGNOSIS — M18.12 ARTHRITIS OF CARPOMETACARPAL (CMC) JOINT OF LEFT THUMB: Primary | ICD-10-CM

## 2022-12-12 DIAGNOSIS — G89.29 CHRONIC PAIN OF LEFT THUMB: Primary | ICD-10-CM

## 2022-12-12 DIAGNOSIS — M18.12 ARTHRITIS OF CARPOMETACARPAL (CMC) JOINT OF LEFT THUMB: ICD-10-CM

## 2022-12-12 NOTE — PROGRESS NOTES
was provided with verbal education/handout regarding splint care, wear, precautions, and hygiene   Pt properly donned/doffed splint. In-depth education on diagnosis, prognosis, and current restrictions. Further educated on pain management techniques to initiate including modalities, elevation, and retrograde massage to manage symptoms. Educated on purchase of comfort cool brace to transition into in 1 month for continued symptom management if thumb spica splint works well. Pt demonstrated good understanding throughout. Splint Care: Splint can be washed with mild soap and cool water. Splint needs to air dry. Avoid leaving splint in or near a source of heat such as a hot car or a space heater. Use nail polish remover to remove stains on splint. Use Purell will decrease any odor that may develop. Splint Precuations: Patient was instructed to remove splint and contact therapist if the following occur:   1. Redness that lasts longer that 15-20 mins   2. Increased swelling   3. Increased pain   4. Pressure Sores    Wear Schedule: 24/7 for first 2-4 weeks then wean to resistive/repetitive/impactful tasks only to follow if pain is managed. Rehab Potential: Fair +   Recommendations: Outpatient OT  Goal Formulation: Patient  Requires OT Follow Up: Yes    Plan   Plan: Plan of care initiated. Pt was seen today for splint only. Pt may need an additional visit for splint modification. We will place pt on hold at this time until MD provides therapy clearance if required. Goals (1 session):  1. Patient will verbalize and demo ability to don/doff splint appropriately in 1 session with good accuracy   Goal Met.  2. Patient will verbalize understanding of splint precautions, splint care, and wear schedule in 1 session   Goal Met.  3. Patient will demonstrate understand of stretching and/or exercise provided in 1 session. Goal Met.     Total Tx Time: 25 min (15 min ortho fit/train x 1 unit, 10 min TA x 1 unit)    By co-signing this document, I demonstrate that I have reviewed the Plan of Care established for skilled therapy services and certify that the services are required and that they will be provided while the patient is under my care. If I have any comments or revisions to make to the POC, I will notify the evaluating therapist at the earliest convenience.        Nata Almeida, OTR/L, [address], #[id number]

## 2022-12-12 NOTE — PROGRESS NOTES
New Wrist/Hand Patient Visit     Referring Provider:   No referring provider defined for this encounter. CHIEF COMPLAINT:   Chief Complaint   Patient presents with    Knee Pain     FU Left knee pain after injection and home exercises. Pain much better with walking. Getting up from sitting position still painful. Finger Pain     Left thumb pain. Right hand dominant. Uses Voltaren Gel as needed. No recent X-rays. No recall of injury. HPI:      Mimi Blackmon is a 79y.o. year old male who is right-hand dominant. His last visit I gave him a steroid injection and some home exercises. His knee pain is much better. It sometimes hurts when he gets up from sitting too long but otherwise he is doing well and is happy with his results. His biggest complaint is left basilar thumb pain. He states he has been using Voltaren gel. This sometimes help. It is worse with activity specifically wringing out rags. It is sharp and nonradiating. He has some weakness with .     PAST MEDICAL HISTORY  Past Medical History:   Diagnosis Date    Atrial fibrillation ([address]     Atrial fibrillation with RVR ([address] 9/23/2011    Basal cell cancer     resected from left leg    Current use of long term anticoagulation     Dyslipidemia     Gastric ulcer approx 2015    Hyperlipidemia     Hypertension     Myocardial infarct ([address] 1981    PVD (peripheral vascular disease) with claudication ([address] 1/9/2014    S/P femoral-popliteal bypass surgery 1/9/2014    Shoulder problem     Tachycardia     Type 2 diabetes mellitus without complication ([address]        PAST SURGICAL HISTORY  Past Surgical History:   Procedure Laterality Date    ARTERIAL ANEURYSM REPAIR      popliteal artery - Dr. Lauro Durán, 11/2002    CARDIOVERSION  10/19/2011    DCCV    CORONARY ANGIOPLASTY  7/26/1988, 9/19/1988    CORONARY ARTERY BYPASS GRAFT  11/26/2002    ECHO COMPL W DOP COLOR FLOW  9/24/2011         UPPER GASTROINTESTINAL ENDOSCOPY         FAMILY HISTORY   Family History   Problem Relation Age of Onset    Cancer Mother         Esophageal CA    Cancer Father         Lung CA       SOCIAL HISTORY  Social History     Occupational History    Not on file   Tobacco Use    Smoking status: Former     Packs/day: 2.00     Years: 20.00     Pack years: 40.00     Types: Cigarettes     Quit date: 9/3/1980     Years since quittin.3    Smokeless tobacco: Never   Vaping Use    Vaping Use: Never used   Substance and Sexual Activity    Alcohol use:  Yes     Alcohol/week: 7.0 standard drinks     Types: 5 Glasses of wine, 2 Cans of beer per week     Comment: Wine a few glasses a week and beer    Drug use: No    Sexual activity: Not on file       CURRENT MEDICATIONS     Current Outpatient Medications:     cycloSPORINE (RESTASIS) 0.05 % ophthalmic emulsion, Place 1 drop into both eyes 2 times daily, Disp: , Rfl:     losartan (COZAAR) 25 MG tablet, Take 1 tablet by mouth daily, Disp: 90 tablet, Rfl: 3    rivaroxaban (XARELTO) 20 MG TABS tablet, Take 1 tablet by mouth daily (with breakfast) Indications: pt takes with dinner, Disp: 90 tablet, Rfl: 3    bimatoprost (LUMIGAN) 0.01 % SOLN ophthalmic drops, Place 1 drop into both eyes daily, Disp: 7.5 mL, Rfl: 2    rosuvastatin (CRESTOR) 5 MG tablet, Take 1 tablet by mouth daily, Disp: 90 tablet, Rfl: 3    ibuprofen (ADVIL;MOTRIN) 200 MG tablet, Take 200 mg by mouth every 6 hours as needed for Pain, Disp: , Rfl:     celecoxib (CELEBREX) 100 MG capsule, Take 100 mg by mouth as needed , Disp: , Rfl:     aspirin 81 MG EC tablet, Take 81 mg by mouth daily , Disp: , Rfl:     ALLERGIES  Allergies   Allergen Reactions    Diltiazem Hcl      Bradycardia         Controlled Substances Monitoring:        REVIEW OF SYSTEMS:     Constitutional:  Negative for weight loss, fevers, chills, fatigue  Cardiovascular: Negative for chest pain, palpitations  Pulmonary: Negative for shortness of breath, labored breathing, cough  GI: negative for abdominal pain, nausea, vomitting   MSK: per HPI  Skin: negative for rash, open wounds    All other systems reviewed and are negative           PHYSICAL EXAM     Vitals:    12/12/22 1021   Weight: 200 lb (90.7 kg)   Height: 5' 10\" (1.778 m)       Height: 5' 10\" (1.778 m)  Weight: [unfilled]  BMI:  Body mass index is 28.7 kg/m². General: The patient is alert and oriented x 3, appears to be stated age and in no distress. HEENT: head is normocephalic, atraumatic. EOMI. Neck: supple, trachea midline, no thyromegaly   Cardiovascular: peripheral pulses palpable. Normal Capillary refill   Respiratory: breathing unlabored, chest expansion symmetric   Skin: no rash, no open wounds, no erythema  Psych: normal affect; mood stable  Neurologic: gait normal, sensation grossly intact in extremities  MSK:        Hand/Wrist:  -Left hand: Atraumatic. He has a positive CMC grind. He has full 5 out of 5 strength of the EPL and FPL. He is able to make a full fist and fully extend his fingers without pain. Negative Finkelstein's. Sensation light touch is intact to radial ulnar median nerve distribution. No Z deformity noted. Hand is warm well perfused      IMAGING:     XRAY:  3 views of the left hand demonstrate significant thumb CMC joint arthritis without fractures or dislocation    Radiographic findings reviewed with patient    ASSESSMENT  Left knee osteoarthritis, resolving with injection  Left basal thumb arthritis      PLAN  -His knee pain is improved greatly and he is happy with his results. He should continue his low impact exercise including walking at the mall. If his knee flares up again we can inject it. We did talk about replacements which he is hoping to avoid. We did talk about left thumb basal joint arthritis as well. We can inject this if he gets bad enough. Today we are going to send him to occupational therapy to get hand-based thumb spica splint for support which she is happy with. He is going to come back if he needs an injection in his knee or his thumb in the future. All of his questions answered in detail. He is happy with this treatment plan. He can follow-up as needed.           Beverly Ortiz, DO  Orthopaedic Surgery   12/12/22   10:32 AM EST

## 2022-12-19 ENCOUNTER — OFFICE VISIT (OUTPATIENT)
Dept: PRIMARY CARE CLINIC | Age: 82
End: 2022-12-19
Payer: MEDICARE

## 2022-12-19 VITALS
TEMPERATURE: 97.2 F | HEART RATE: 84 BPM | OXYGEN SATURATION: 98 % | RESPIRATION RATE: 20 BRPM | WEIGHT: 200 LBS | BODY MASS INDEX: 28.63 KG/M2 | DIASTOLIC BLOOD PRESSURE: 78 MMHG | HEIGHT: 70 IN | SYSTOLIC BLOOD PRESSURE: 122 MMHG

## 2022-12-19 DIAGNOSIS — J01.90 ACUTE SINUSITIS, RECURRENCE NOT SPECIFIED, UNSPECIFIED LOCATION: Primary | ICD-10-CM

## 2022-12-19 PROCEDURE — 1123F ACP DISCUSS/DSCN MKR DOCD: CPT | Performed by: INTERNAL MEDICINE

## 2022-12-19 PROCEDURE — 3078F DIAST BP <80 MM HG: CPT | Performed by: INTERNAL MEDICINE

## 2022-12-19 PROCEDURE — 3074F SYST BP LT 130 MM HG: CPT | Performed by: INTERNAL MEDICINE

## 2022-12-19 PROCEDURE — 99213 OFFICE O/P EST LOW 20 MIN: CPT | Performed by: INTERNAL MEDICINE

## 2022-12-19 RX ORDER — AZITHROMYCIN 250 MG/1
250 TABLET, FILM COATED ORAL SEE ADMIN INSTRUCTIONS
Qty: 6 TABLET | Refills: 0 | Status: SHIPPED | OUTPATIENT
Start: 2022-12-19 | End: 2022-12-24

## 2022-12-19 NOTE — PROGRESS NOTES
Chantel Come  12/19/22     Chief Complaint   Patient presents with    Sinus Problem     Draining for several days, pressure, green mucus         Allergies   Allergen Reactions    Diltiazem Hcl      Bradycardia          Current Outpatient Medications   Medication Sig Dispense Refill    azithromycin (ZITHROMAX) 250 MG tablet Take 1 tablet by mouth See Admin Instructions for 5 days 500mg on day 1 followed by 250mg on days 2 - 5 6 tablet 0    cycloSPORINE (RESTASIS) 0.05 % ophthalmic emulsion Place 1 drop into both eyes 2 times daily      losartan (COZAAR) 25 MG tablet Take 1 tablet by mouth daily 90 tablet 3    rivaroxaban (XARELTO) 20 MG TABS tablet Take 1 tablet by mouth daily (with breakfast) Indications: pt takes with dinner 90 tablet 3    bimatoprost (LUMIGAN) 0.01 % SOLN ophthalmic drops Place 1 drop into both eyes daily 7.5 mL 2    rosuvastatin (CRESTOR) 5 MG tablet Take 1 tablet by mouth daily 90 tablet 3    ibuprofen (ADVIL;MOTRIN) 200 MG tablet Take 200 mg by mouth every 6 hours as needed for Pain      celecoxib (CELEBREX) 100 MG capsule Take 100 mg by mouth as needed       aspirin 81 MG EC tablet Take 81 mg by mouth daily        No current facility-administered medications for this visit. HPI: Patient complains of nasal and sinus congestion and nasal drainage over the past week. His drainage has turned green and he has some pressure across his frontal and maxillary sinus areas. He denies any fever or chills. He denies any chest pain or shortness of breath. Review of Systems: as per HPI      Physical Exam:    Vitals:    12/19/22 0950   BP: 122/78   Pulse: 84   Resp: 20   Temp: 97.2 °F (36.2 °C)   SpO2: 98%       Patient is a 79 y.o. male. Patient appears to be in no distress. Breathing comfortably. Ambulates without assistance. HEENT: Mild nasal sinus congestion. Small amount of greenish postnasal drainage noted. Neck supple, no adenopathy or bruits. Heart RR, no MGR. Lungs clear. Assessment:    Enrique Parker was seen today for sinus problem. Diagnoses and all orders for this visit:    Acute sinusitis, recurrence not specified, unspecified location  -     azithromycin (ZITHROMAX) 250 MG tablet; Take 1 tablet by mouth See Admin Instructions for 5 days 500mg on day 1 followed by 250mg on days 2 - 5        Discussion Notes: We will start him on a Z-Rafiq as directed. He is reminded to stay adequately hydrated and may take Coricidin HBP as needed for congestion. He will call in a week if his symptoms or not resolved.

## 2023-02-18 DIAGNOSIS — E78.2 MIXED HYPERLIPIDEMIA: ICD-10-CM

## 2023-02-20 RX ORDER — ROSUVASTATIN CALCIUM 5 MG/1
TABLET, COATED ORAL
Qty: 90 TABLET | Refills: 3 | Status: SHIPPED | OUTPATIENT
Start: 2023-02-20

## 2023-03-07 DIAGNOSIS — E78.2 MIXED HYPERLIPIDEMIA: ICD-10-CM

## 2023-03-07 DIAGNOSIS — E11.51 TYPE 2 DIABETES MELLITUS WITH DIABETIC PERIPHERAL ANGIOPATHY WITHOUT GANGRENE, WITHOUT LONG-TERM CURRENT USE OF INSULIN (HCC): ICD-10-CM

## 2023-03-07 DIAGNOSIS — I10 ESSENTIAL HYPERTENSION: ICD-10-CM

## 2023-03-07 LAB
ALBUMIN SERPL-MCNC: 4.4 G/DL (ref 3.5–5.2)
ALP BLD-CCNC: 67 U/L (ref 40–129)
ALT SERPL-CCNC: 36 U/L (ref 0–40)
ANION GAP SERPL CALCULATED.3IONS-SCNC: 12 MMOL/L (ref 7–16)
AST SERPL-CCNC: 28 U/L (ref 0–39)
BILIRUB SERPL-MCNC: 0.8 MG/DL (ref 0–1.2)
BUN BLDV-MCNC: 27 MG/DL (ref 6–23)
CALCIUM SERPL-MCNC: 9.4 MG/DL (ref 8.6–10.2)
CHLORIDE BLD-SCNC: 105 MMOL/L (ref 98–107)
CHOLESTEROL, TOTAL: 136 MG/DL (ref 0–199)
CO2: 25 MMOL/L (ref 22–29)
CREAT SERPL-MCNC: 1 MG/DL (ref 0.7–1.2)
GFR SERPL CREATININE-BSD FRML MDRD: >60 ML/MIN/1.73
GLUCOSE BLD-MCNC: 104 MG/DL (ref 74–99)
HBA1C MFR BLD: 6.5 % (ref 4–5.6)
HDLC SERPL-MCNC: 47 MG/DL
LDL CHOLESTEROL CALCULATED: 71 MG/DL (ref 0–99)
POTASSIUM SERPL-SCNC: 4.8 MMOL/L (ref 3.5–5)
SODIUM BLD-SCNC: 142 MMOL/L (ref 132–146)
TOTAL PROTEIN: 7.1 G/DL (ref 6.4–8.3)
TRIGL SERPL-MCNC: 89 MG/DL (ref 0–149)
VLDLC SERPL CALC-MCNC: 18 MG/DL

## 2023-03-11 SDOH — ECONOMIC STABILITY: FOOD INSECURITY: WITHIN THE PAST 12 MONTHS, THE FOOD YOU BOUGHT JUST DIDN'T LAST AND YOU DIDN'T HAVE MONEY TO GET MORE.: NEVER TRUE

## 2023-03-11 SDOH — ECONOMIC STABILITY: TRANSPORTATION INSECURITY
IN THE PAST 12 MONTHS, HAS LACK OF TRANSPORTATION KEPT YOU FROM MEETINGS, WORK, OR FROM GETTING THINGS NEEDED FOR DAILY LIVING?: NO

## 2023-03-11 SDOH — ECONOMIC STABILITY: HOUSING INSECURITY
IN THE LAST 12 MONTHS, WAS THERE A TIME WHEN YOU DID NOT HAVE A STEADY PLACE TO SLEEP OR SLEPT IN A SHELTER (INCLUDING NOW)?: NO

## 2023-03-11 SDOH — ECONOMIC STABILITY: FOOD INSECURITY: WITHIN THE PAST 12 MONTHS, YOU WORRIED THAT YOUR FOOD WOULD RUN OUT BEFORE YOU GOT MONEY TO BUY MORE.: NEVER TRUE

## 2023-03-11 SDOH — ECONOMIC STABILITY: INCOME INSECURITY: HOW HARD IS IT FOR YOU TO PAY FOR THE VERY BASICS LIKE FOOD, HOUSING, MEDICAL CARE, AND HEATING?: NOT HARD AT ALL

## 2023-03-14 ENCOUNTER — OFFICE VISIT (OUTPATIENT)
Dept: PRIMARY CARE CLINIC | Age: 83
End: 2023-03-14

## 2023-03-14 VITALS
OXYGEN SATURATION: 98 % | HEIGHT: 70 IN | HEART RATE: 57 BPM | RESPIRATION RATE: 18 BRPM | BODY MASS INDEX: 31.21 KG/M2 | SYSTOLIC BLOOD PRESSURE: 117 MMHG | TEMPERATURE: 97.9 F | DIASTOLIC BLOOD PRESSURE: 78 MMHG | WEIGHT: 218 LBS

## 2023-03-14 DIAGNOSIS — I48.21 PERMANENT ATRIAL FIBRILLATION (HCC): ICD-10-CM

## 2023-03-14 DIAGNOSIS — E11.51 TYPE 2 DIABETES MELLITUS WITH DIABETIC PERIPHERAL ANGIOPATHY WITHOUT GANGRENE, WITHOUT LONG-TERM CURRENT USE OF INSULIN (HCC): Primary | ICD-10-CM

## 2023-03-14 DIAGNOSIS — I70.209 ATHEROSCLEROTIC PERIPHERAL VASCULAR DISEASE (HCC): ICD-10-CM

## 2023-03-14 DIAGNOSIS — E78.00 PURE HYPERCHOLESTEROLEMIA: ICD-10-CM

## 2023-03-14 DIAGNOSIS — I25.10 CORONARY ARTERY DISEASE INVOLVING NATIVE CORONARY ARTERY OF NATIVE HEART WITHOUT ANGINA PECTORIS: Chronic | ICD-10-CM

## 2023-03-14 DIAGNOSIS — I71.40 ABDOMINAL AORTIC ANEURYSM (AAA) WITHOUT RUPTURE, UNSPECIFIED PART: ICD-10-CM

## 2023-03-14 DIAGNOSIS — I72.4 ANEURYSM OF RIGHT POPLITEAL ARTERY (HCC): ICD-10-CM

## 2023-03-14 DIAGNOSIS — R79.89 PRERENAL AZOTEMIA: ICD-10-CM

## 2023-03-14 DIAGNOSIS — I10 ESSENTIAL HYPERTENSION: ICD-10-CM

## 2023-03-14 DIAGNOSIS — Z79.01 ANTICOAGULATED: ICD-10-CM

## 2023-03-14 SDOH — ECONOMIC STABILITY: FOOD INSECURITY: WITHIN THE PAST 12 MONTHS, THE FOOD YOU BOUGHT JUST DIDN'T LAST AND YOU DIDN'T HAVE MONEY TO GET MORE.: NEVER TRUE

## 2023-03-14 SDOH — ECONOMIC STABILITY: FOOD INSECURITY: WITHIN THE PAST 12 MONTHS, YOU WORRIED THAT YOUR FOOD WOULD RUN OUT BEFORE YOU GOT MONEY TO BUY MORE.: NEVER TRUE

## 2023-03-14 SDOH — ECONOMIC STABILITY: INCOME INSECURITY: HOW HARD IS IT FOR YOU TO PAY FOR THE VERY BASICS LIKE FOOD, HOUSING, MEDICAL CARE, AND HEATING?: NOT HARD AT ALL

## 2023-03-14 ASSESSMENT — PATIENT HEALTH QUESTIONNAIRE - PHQ9
SUM OF ALL RESPONSES TO PHQ9 QUESTIONS 1 & 2: 0
SUM OF ALL RESPONSES TO PHQ QUESTIONS 1-9: 0
SUM OF ALL RESPONSES TO PHQ QUESTIONS 1-9: 0
2. FEELING DOWN, DEPRESSED OR HOPELESS: 0
SUM OF ALL RESPONSES TO PHQ QUESTIONS 1-9: 0
SUM OF ALL RESPONSES TO PHQ QUESTIONS 1-9: 0
1. LITTLE INTEREST OR PLEASURE IN DOING THINGS: 0

## 2023-03-16 PROBLEM — R79.89 PRERENAL AZOTEMIA: Status: ACTIVE | Noted: 2023-03-16

## 2023-07-05 DIAGNOSIS — I71.43 INFRARENAL ABDOMINAL AORTIC ANEURYSM (AAA) WITHOUT RUPTURE (HCC): ICD-10-CM

## 2023-07-05 DIAGNOSIS — I73.9 PERIPHERAL VASCULAR DISEASE (HCC): ICD-10-CM

## 2023-07-05 DIAGNOSIS — I72.4 ANEURYSM OF RIGHT POPLITEAL ARTERY (HCC): ICD-10-CM

## 2023-07-05 DIAGNOSIS — I70.209 ATHEROSCLEROTIC PERIPHERAL VASCULAR DISEASE (HCC): Primary | ICD-10-CM

## 2023-07-20 ENCOUNTER — OFFICE VISIT (OUTPATIENT)
Dept: VASCULAR SURGERY | Age: 83
End: 2023-07-20
Payer: MEDICARE

## 2023-07-20 ENCOUNTER — HOSPITAL ENCOUNTER (OUTPATIENT)
Dept: CARDIOLOGY | Age: 83
Discharge: HOME | End: 2023-07-20
Payer: MEDICARE

## 2023-07-20 VITALS — WEIGHT: 200 LBS | HEIGHT: 70 IN | BODY MASS INDEX: 28.63 KG/M2

## 2023-07-20 DIAGNOSIS — I73.9 PERIPHERAL VASCULAR DISEASE (HCC): ICD-10-CM

## 2023-07-20 DIAGNOSIS — I71.43 INFRARENAL ABDOMINAL AORTIC ANEURYSM (AAA) WITHOUT RUPTURE (HCC): ICD-10-CM

## 2023-07-20 DIAGNOSIS — Z95.828 S/P FEMORAL-POPLITEAL BYPASS SURGERY: Primary | ICD-10-CM

## 2023-07-20 DIAGNOSIS — I72.4 ANEURYSM OF RIGHT POPLITEAL ARTERY (HCC): ICD-10-CM

## 2023-07-20 DIAGNOSIS — I70.209 ATHEROSCLEROTIC PERIPHERAL VASCULAR DISEASE (HCC): ICD-10-CM

## 2023-07-20 PROCEDURE — 1123F ACP DISCUSS/DSCN MKR DOCD: CPT | Performed by: SURGERY

## 2023-07-20 PROCEDURE — 93923 UPR/LXTR ART STDY 3+ LVLS: CPT

## 2023-07-20 PROCEDURE — 99214 OFFICE O/P EST MOD 30 MIN: CPT | Performed by: SURGERY

## 2023-07-20 PROCEDURE — 93978 VASCULAR STUDY: CPT

## 2023-07-20 PROCEDURE — 93926 LOWER EXTREMITY STUDY: CPT

## 2023-07-20 NOTE — PROGRESS NOTES
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, also noted to have a small abdominal aortic exam and right popliteal artery, overall doing well, no symptoms, no abdominal pain or back pain      Patient denies any focal lateralizing neurological symptoms like loss of speech, vision or loss of function of extremity    Patient can walk a few blocks without difficulty, and denies any symptoms of rest pain    Allergies   Allergen Reactions    Diltiazem Hcl      Bradycardia         Current Outpatient Medications   Medication Sig Dispense Refill    rosuvastatin (CRESTOR) 5 MG tablet TAKE 1 TABLET DAILY 90 tablet 3    cycloSPORINE (RESTASIS) 0.05 % ophthalmic emulsion Place 1 drop into both eyes 2 times daily      losartan (COZAAR) 25 MG tablet Take 1 tablet by mouth daily 90 tablet 3    rivaroxaban (XARELTO) 20 MG TABS tablet Take 1 tablet by mouth daily (with breakfast) Indications: pt takes with dinner 90 tablet 3    bimatoprost (LUMIGAN) 0.01 % SOLN ophthalmic drops Place 1 drop into both eyes daily 7.5 mL 2    celecoxib (CELEBREX) 100 MG capsule Take 1 capsule by mouth as needed      aspirin 81 MG EC tablet Take 1 tablet by mouth daily       No current facility-administered medications for this visit.        Past Medical History:   Diagnosis Date    Atrial fibrillation [facility])     Atrial fibrillation with RVR ([address]) 9/23/2011    Basal cell cancer     resected from left leg    Current use of long term anticoagulation     Dyslipidemia     Gastric ulcer approx 2015    Hyperlipidemia     Hypertension     Myocardial infarct ([address]) 1981    PVD (peripheral vascular disease) with claudication ([address]) 1/9/2014    S/P femoral-popliteal bypass surgery 1/9/2014    Shoulder problem

## 2023-07-20 NOTE — PROGRESS NOTES
[facility] - [facility]    This is a pre read worksheet - prior to official physician interpretation    Judithpatricia Verdugo  1940  Date of study: 7/20/23    Indication for study:  AAA  Study :  Aortic Ultrasound    Diameter Aorta:     Proximal:   cm     Mid:   cm     Distal:  3.2 x 3.1 cm     Right iliac: 2.5 x 2.3 cm     Left iliac: 2.0 x 1.9 cm    Additional comments:         LAST STUDY   7/14/2022  3.2 cm  Rt 2.2  Lt 2.0

## 2023-07-20 NOTE — PROGRESS NOTES
[facility] - [facility]    This is a pre read worksheet - prior to official physician interpretation    Ela Banks  1940  Date of study: 7/20/23    Study : Right Popliteal Artery Duplex    RIGHT POPLITEAL ARTERY   1.8 x 1.7 cm      Additional comments:             LAST STUDY   7/14/2022  1.8 cm

## 2023-07-21 NOTE — PROCEDURES
[address], [facility]                                VASCULAR REPORT    PATIENT NAME: Javid Campos                         :        1940  MED REC NO:   [id number]                            ROOM:  ACCOUNT NO:   [de-identified]                           ADMIT DATE: 2023  PROVIDER:     Lexus Ventura MD    DATE OF PROCEDURE:  2023    INDICATION:  History of left femoropopliteal bypass surgery and right  popliteal artery aneurysm. FINDINGS:  The lower extremity arterial Doppler study, with normal  ankle-arm index, normal triphasic ankle Doppler tracings and good  arterial flow to both feet based upon the pulse volume recordings over  the metatarsals.         Lexus Ventura MD    D: 2023 6:14:26       T: 2023 6:16:43     [id number]  Job#: [id number]     Doc#: [id number]    CC:

## 2023-07-21 NOTE — PROCEDURES
[address], [facility] [address]                                VASCULAR REPORT    PATIENT NAME: Ramila Mayen                         :        1940  MED REC NO:   [id number]                            ROOM:  ACCOUNT NO:   [de-identified]                           ADMIT DATE: 2023  PROVIDER:     Justin Shukla MD    DATE OF PROCEDURE:  2023    ULTRASOUND OF ABDOMINAL AORTA    INDICATION:  History of abdominal aortic aneurysm. 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         Justin Shukla MD    D: 2023 6:13:47       T: 2023 6:16:10     [id number]  Job#: [id number]     Doc#: [id number]    CC:

## 2023-08-07 ENCOUNTER — OFFICE VISIT (OUTPATIENT)
Dept: CARDIOLOGY CLINIC | Age: 83
End: 2023-08-07
Payer: MEDICARE

## 2023-08-07 VITALS
HEART RATE: 56 BPM | DIASTOLIC BLOOD PRESSURE: 90 MMHG | SYSTOLIC BLOOD PRESSURE: 128 MMHG | RESPIRATION RATE: 12 BRPM | WEIGHT: 204 LBS | BODY MASS INDEX: 28.56 KG/M2 | HEIGHT: 71 IN

## 2023-08-07 DIAGNOSIS — I10 ESSENTIAL HYPERTENSION: ICD-10-CM

## 2023-08-07 DIAGNOSIS — I73.9 PERIPHERAL VASCULAR DISEASE, UNSPECIFIED (HCC): ICD-10-CM

## 2023-08-07 DIAGNOSIS — I25.10 CORONARY ARTERY DISEASE INVOLVING NATIVE CORONARY ARTERY OF NATIVE HEART WITHOUT ANGINA PECTORIS: Primary | Chronic | ICD-10-CM

## 2023-08-07 DIAGNOSIS — Z79.01 CHRONIC ANTICOAGULATION: ICD-10-CM

## 2023-08-07 DIAGNOSIS — I48.21 PERMANENT ATRIAL FIBRILLATION (HCC): ICD-10-CM

## 2023-08-07 PROCEDURE — 1123F ACP DISCUSS/DSCN MKR DOCD: CPT | Performed by: INTERNAL MEDICINE

## 2023-08-07 PROCEDURE — 3078F DIAST BP <80 MM HG: CPT | Performed by: INTERNAL MEDICINE

## 2023-08-07 PROCEDURE — 99213 OFFICE O/P EST LOW 20 MIN: CPT | Performed by: INTERNAL MEDICINE

## 2023-08-07 PROCEDURE — 3074F SYST BP LT 130 MM HG: CPT | Performed by: INTERNAL MEDICINE

## 2023-08-07 PROCEDURE — 93000 ELECTROCARDIOGRAM COMPLETE: CPT | Performed by: INTERNAL MEDICINE

## 2023-08-07 NOTE — PROGRESS NOTES
regurgitation is noted. Exercise nuclear stress test: 10/10/18  1. Exercise EKG was negative. 2. The patient experienced no chest pain with exercise. 3. The myocardial perfusion imaging was normal with attenuation artifact   4. Overall left ventricular systolic function was abnormal without regional wall motion abnormalities. 5. Parham treadmill score was 7 implying low risk. 6. Exercise capacity was above average. 7. Intermediate risk general exercise treadmill test. Due to LV dysfunction. 8. Gated SPECT left ventricular ejection fraction was calculated to be 43%, with hypokinesis of the global wall. ASSESSMENT / PLAN:   Acute inferior wall MI, 10/1981. Medical therapy. Cath with CX angioplasty, 1988. Cath with CX angioplasty, 1988. Worsening chest pain prompting catheterization, 2002. CABG, 2002, [facility], Dr. Ra Jiménez. YAP-LAD, free pedicle ISAIAH-diagonal, left radial graft-PDA and SVG-OM branch of CX. 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 Shana Chaney. Abdominal aortic US, 2008. Abdominal aortic maximal diameter of 3 cm. No definite aneurysm seen. Dyslipidemia. Total cholesterol 141, HDL 41, LDL 76 - . Echo, 2008. Normal LV size and overall systolic function. LA dilation present. Normal valves. LA diameter 4.7 cm. Exercise MPS, 2008. EF 56%. Abnormal septal motion consistent with postop state. Mild inferior hypokinesis. No ischemia or scarring. No TID. Low risk exam.  Family history negative for early coronary disease. His father  of cancer and his mother had esophageal cancer. Abdominal aortic US, 2010. Atherosclerotic change with mild dilation of the distal abdominal aorta with maximum diameter 3 cm, unchanged from 2008, followed yearly by Bouchra Seen. Lower extremity arterial duplex study revealed thrombosed aneurysm, left popliteal fossa with normal popliteal artery flow.  Normal JACINDA and normal

## 2023-08-14 ENCOUNTER — OFFICE VISIT (OUTPATIENT)
Dept: PRIMARY CARE CLINIC | Age: 83
End: 2023-08-14
Payer: MEDICARE

## 2023-08-14 VITALS
HEIGHT: 71 IN | DIASTOLIC BLOOD PRESSURE: 80 MMHG | HEART RATE: 78 BPM | OXYGEN SATURATION: 98 % | RESPIRATION RATE: 18 BRPM | SYSTOLIC BLOOD PRESSURE: 130 MMHG | BODY MASS INDEX: 28.42 KG/M2 | TEMPERATURE: 98 F | WEIGHT: 203 LBS

## 2023-08-14 DIAGNOSIS — N48.1 BALANITIS: Primary | ICD-10-CM

## 2023-08-14 PROCEDURE — 3074F SYST BP LT 130 MM HG: CPT | Performed by: INTERNAL MEDICINE

## 2023-08-14 PROCEDURE — 1123F ACP DISCUSS/DSCN MKR DOCD: CPT | Performed by: INTERNAL MEDICINE

## 2023-08-14 PROCEDURE — 3078F DIAST BP <80 MM HG: CPT | Performed by: INTERNAL MEDICINE

## 2023-08-14 PROCEDURE — 99213 OFFICE O/P EST LOW 20 MIN: CPT | Performed by: INTERNAL MEDICINE

## 2023-08-14 RX ORDER — CLOTRIMAZOLE AND BETAMETHASONE DIPROPIONATE 10; .64 MG/G; MG/G
CREAM TOPICAL
Qty: 30 G | Refills: 0 | Status: SHIPPED | OUTPATIENT
Start: 2023-08-14

## 2023-09-06 DIAGNOSIS — I10 ESSENTIAL HYPERTENSION: ICD-10-CM

## 2023-09-06 RX ORDER — LOSARTAN POTASSIUM 25 MG/1
25 TABLET ORAL DAILY
Qty: 90 TABLET | Refills: 3 | Status: SHIPPED | OUTPATIENT
Start: 2023-09-06

## 2023-09-11 DIAGNOSIS — E78.00 PURE HYPERCHOLESTEROLEMIA: ICD-10-CM

## 2023-09-11 DIAGNOSIS — Z79.01 ANTICOAGULATED: ICD-10-CM

## 2023-09-11 DIAGNOSIS — E11.51 TYPE 2 DIABETES MELLITUS WITH DIABETIC PERIPHERAL ANGIOPATHY WITHOUT GANGRENE, WITHOUT LONG-TERM CURRENT USE OF INSULIN (HCC): ICD-10-CM

## 2023-09-11 DIAGNOSIS — I10 ESSENTIAL HYPERTENSION: ICD-10-CM

## 2023-09-12 LAB
ALBUMIN SERPL-MCNC: 4.4 G/DL (ref 3.5–5.2)
ALP BLD-CCNC: 69 U/L (ref 40–129)
ALT SERPL-CCNC: 32 U/L (ref 0–40)
ANION GAP SERPL CALCULATED.3IONS-SCNC: 14 MMOL/L (ref 7–16)
AST SERPL-CCNC: 25 U/L (ref 0–39)
BILIRUB SERPL-MCNC: 0.6 MG/DL (ref 0–1.2)
BUN BLDV-MCNC: 23 MG/DL (ref 6–23)
CALCIUM SERPL-MCNC: 9.6 MG/DL (ref 8.6–10.2)
CHLORIDE BLD-SCNC: 105 MMOL/L (ref 98–107)
CHOLESTEROL: 135 MG/DL
CO2: 24 MMOL/L (ref 22–29)
CREAT SERPL-MCNC: 0.9 MG/DL (ref 0.7–1.2)
GFR SERPL CREATININE-BSD FRML MDRD: >60 ML/MIN/1.73M2
GLUCOSE BLD-MCNC: 109 MG/DL (ref 74–99)
HBA1C MFR BLD: 6.5 % (ref 4–5.6)
HCT VFR BLD CALC: 44.2 % (ref 37–54)
HDLC SERPL-MCNC: 51 MG/DL
HEMOGLOBIN: 14.7 G/DL (ref 12.5–16.5)
LDL CHOLESTEROL: 69 MG/DL
MCH RBC QN AUTO: 33.6 PG (ref 26–35)
MCHC RBC AUTO-ENTMCNC: 33.3 G/DL (ref 32–34.5)
MCV RBC AUTO: 100.9 FL (ref 80–99.9)
PDW BLD-RTO: 13.2 % (ref 11.5–15)
PLATELET # BLD: 155 K/UL (ref 130–450)
PMV BLD AUTO: 10.9 FL (ref 7–12)
POTASSIUM SERPL-SCNC: 4.5 MMOL/L (ref 3.5–5)
RBC # BLD: 4.38 M/UL (ref 3.8–5.8)
SODIUM BLD-SCNC: 143 MMOL/L (ref 132–146)
TOTAL PROTEIN: 7.4 G/DL (ref 6.4–8.3)
TRIGL SERPL-MCNC: 74 MG/DL
TSH SERPL DL<=0.05 MIU/L-ACNC: 2.05 UIU/ML (ref 0.27–4.2)
VLDLC SERPL CALC-MCNC: 15 MG/DL
WBC # BLD: 5.1 K/UL (ref 4.5–11.5)

## 2023-09-15 SDOH — HEALTH STABILITY: PHYSICAL HEALTH: ON AVERAGE, HOW MANY MINUTES DO YOU ENGAGE IN EXERCISE AT THIS LEVEL?: 70 MIN

## 2023-09-15 SDOH — HEALTH STABILITY: PHYSICAL HEALTH: ON AVERAGE, HOW MANY DAYS PER WEEK DO YOU ENGAGE IN MODERATE TO STRENUOUS EXERCISE (LIKE A BRISK WALK)?: 6 DAYS

## 2023-09-15 ASSESSMENT — LIFESTYLE VARIABLES
HOW MANY STANDARD DRINKS CONTAINING ALCOHOL DO YOU HAVE ON A TYPICAL DAY: 1
HOW OFTEN DO YOU HAVE A DRINK CONTAINING ALCOHOL: 4
HOW MANY STANDARD DRINKS CONTAINING ALCOHOL DO YOU HAVE ON A TYPICAL DAY: 1 OR 2
HOW OFTEN DO YOU HAVE A DRINK CONTAINING ALCOHOL: 2-3 TIMES A WEEK
HOW OFTEN DO YOU HAVE SIX OR MORE DRINKS ON ONE OCCASION: 1

## 2023-09-15 ASSESSMENT — PATIENT HEALTH QUESTIONNAIRE - PHQ9
2. FEELING DOWN, DEPRESSED OR HOPELESS: 0
SUM OF ALL RESPONSES TO PHQ QUESTIONS 1-9: 0
SUM OF ALL RESPONSES TO PHQ9 QUESTIONS 1 & 2: 0
SUM OF ALL RESPONSES TO PHQ QUESTIONS 1-9: 0
SUM OF ALL RESPONSES TO PHQ QUESTIONS 1-9: 0
1. LITTLE INTEREST OR PLEASURE IN DOING THINGS: 0
SUM OF ALL RESPONSES TO PHQ QUESTIONS 1-9: 0

## 2023-09-18 ENCOUNTER — OFFICE VISIT (OUTPATIENT)
Dept: PRIMARY CARE CLINIC | Age: 83
End: 2023-09-18
Payer: MEDICARE

## 2023-09-18 VITALS
HEIGHT: 71 IN | BODY MASS INDEX: 28.7 KG/M2 | RESPIRATION RATE: 16 BRPM | TEMPERATURE: 96.8 F | SYSTOLIC BLOOD PRESSURE: 124 MMHG | OXYGEN SATURATION: 99 % | WEIGHT: 205 LBS | HEART RATE: 54 BPM | DIASTOLIC BLOOD PRESSURE: 80 MMHG

## 2023-09-18 DIAGNOSIS — E11.51 TYPE 2 DIABETES MELLITUS WITH DIABETIC PERIPHERAL ANGIOPATHY WITHOUT GANGRENE, WITHOUT LONG-TERM CURRENT USE OF INSULIN (HCC): ICD-10-CM

## 2023-09-18 DIAGNOSIS — I48.21 PERMANENT ATRIAL FIBRILLATION (HCC): ICD-10-CM

## 2023-09-18 DIAGNOSIS — I25.10 CORONARY ARTERY DISEASE INVOLVING NATIVE CORONARY ARTERY OF NATIVE HEART WITHOUT ANGINA PECTORIS: Chronic | ICD-10-CM

## 2023-09-18 DIAGNOSIS — Z79.01 ANTICOAGULATED: ICD-10-CM

## 2023-09-18 DIAGNOSIS — I70.209 ATHEROSCLEROTIC PERIPHERAL VASCULAR DISEASE (HCC): ICD-10-CM

## 2023-09-18 DIAGNOSIS — E78.00 PURE HYPERCHOLESTEROLEMIA: ICD-10-CM

## 2023-09-18 DIAGNOSIS — Z00.00 MEDICARE ANNUAL WELLNESS VISIT, SUBSEQUENT: Primary | ICD-10-CM

## 2023-09-18 DIAGNOSIS — I71.43 INFRARENAL ABDOMINAL AORTIC ANEURYSM (AAA) WITHOUT RUPTURE (HCC): ICD-10-CM

## 2023-09-18 DIAGNOSIS — I72.4 ANEURYSM OF RIGHT POPLITEAL ARTERY (HCC): ICD-10-CM

## 2023-09-18 DIAGNOSIS — Z95.828 S/P FEMORAL-POPLITEAL BYPASS SURGERY: ICD-10-CM

## 2023-09-18 DIAGNOSIS — Z23 ENCOUNTER FOR IMMUNIZATION: ICD-10-CM

## 2023-09-18 PROCEDURE — 3044F HG A1C LEVEL LT 7.0%: CPT | Performed by: INTERNAL MEDICINE

## 2023-09-18 PROCEDURE — 90694 VACC AIIV4 NO PRSRV 0.5ML IM: CPT | Performed by: INTERNAL MEDICINE

## 2023-09-18 PROCEDURE — 3078F DIAST BP <80 MM HG: CPT | Performed by: INTERNAL MEDICINE

## 2023-09-18 PROCEDURE — G0008 ADMIN INFLUENZA VIRUS VAC: HCPCS | Performed by: INTERNAL MEDICINE

## 2023-09-18 PROCEDURE — G0439 PPPS, SUBSEQ VISIT: HCPCS | Performed by: INTERNAL MEDICINE

## 2023-09-18 PROCEDURE — 1123F ACP DISCUSS/DSCN MKR DOCD: CPT | Performed by: INTERNAL MEDICINE

## 2023-09-18 PROCEDURE — 3074F SYST BP LT 130 MM HG: CPT | Performed by: INTERNAL MEDICINE

## 2023-09-18 NOTE — PROGRESS NOTES
1 capsule by mouth as needed Yes Historical Provider, MD   aspirin 81 MG EC tablet Take 1 tablet by mouth daily Yes Historical Provider, MD Grier (Including outside providers/suppliers regularly involved in providing care):   Patient Care Team:  Cecille Altamirano MD as PCP - General (Internal Medicine)  Cecille Altamirano MD as PCP - Empaneled Provider  Froy Avelar MD as Surgeon (Vascular Surgery)  Emerald Rausch MD (Specialist)  Nory Pope MD as Surgeon (Colon and Rectal Surgery)  Erma Nesbitt MD as Consulting Physician (Cardiology)  Barbara Brown MD as Surgeon (Orthopedic Surgery)     Reviewed and updated this visit:  Tobacco  Allergies  Meds  Med Hx  Surg Hx  Soc Hx  Fam Hx         Discussion: He will continue all of his current meds and supplements the same as listed on his med list.  He is encouraged to continue to follow a low refined carbohydrate, low-cholesterol, heart healthy diet and continue regular exercise as tolerated. He will follow-up with his cardiologist and vascular surgeon as per their instructions. Otherwise return as needed in 6 months for follow-up visit and routine labs.

## 2024-03-05 DIAGNOSIS — Z79.01 ANTICOAGULATED: ICD-10-CM

## 2024-03-05 DIAGNOSIS — E78.00 PURE HYPERCHOLESTEROLEMIA: ICD-10-CM

## 2024-03-05 DIAGNOSIS — Z12.5 PROSTATE CANCER SCREENING: Primary | ICD-10-CM

## 2024-03-05 DIAGNOSIS — I10 ESSENTIAL HYPERTENSION: ICD-10-CM

## 2024-03-05 DIAGNOSIS — E11.51 TYPE 2 DIABETES MELLITUS WITH DIABETIC PERIPHERAL ANGIOPATHY WITHOUT GANGRENE, WITHOUT LONG-TERM CURRENT USE OF INSULIN (HCC): Primary | ICD-10-CM

## 2024-03-06 DIAGNOSIS — E11.51 TYPE 2 DIABETES MELLITUS WITH DIABETIC PERIPHERAL ANGIOPATHY WITHOUT GANGRENE, WITHOUT LONG-TERM CURRENT USE OF INSULIN (HCC): ICD-10-CM

## 2024-03-06 DIAGNOSIS — Z79.01 ANTICOAGULATED: ICD-10-CM

## 2024-03-06 DIAGNOSIS — E78.00 PURE HYPERCHOLESTEROLEMIA: ICD-10-CM

## 2024-03-06 DIAGNOSIS — I10 ESSENTIAL HYPERTENSION: ICD-10-CM

## 2024-03-06 DIAGNOSIS — Z12.5 PROSTATE CANCER SCREENING: ICD-10-CM

## 2024-03-07 LAB
ABSOLUTE IMMATURE GRANULOCYTE: <0.03 K/UL (ref 0–0.58)
ALBUMIN SERPL-MCNC: 4.2 G/DL (ref 3.5–5.2)
ALP BLD-CCNC: 63 U/L (ref 40–129)
ALT SERPL-CCNC: 26 U/L (ref 0–40)
ANION GAP SERPL CALCULATED.3IONS-SCNC: 11 MMOL/L (ref 7–16)
AST SERPL-CCNC: 22 U/L (ref 0–39)
BASOPHILS ABSOLUTE: 0.04 K/UL (ref 0–0.2)
BASOPHILS RELATIVE PERCENT: 1 % (ref 0–2)
BILIRUB SERPL-MCNC: 0.6 MG/DL (ref 0–1.2)
BUN BLDV-MCNC: 26 MG/DL (ref 6–23)
CALCIUM SERPL-MCNC: 9.5 MG/DL (ref 8.6–10.2)
CHLORIDE BLD-SCNC: 107 MMOL/L (ref 98–107)
CHOLESTEROL: 119 MG/DL
CO2: 23 MMOL/L (ref 22–29)
CREAT SERPL-MCNC: 1 MG/DL (ref 0.7–1.2)
EOSINOPHILS ABSOLUTE: 0.24 K/UL (ref 0.05–0.5)
EOSINOPHILS RELATIVE PERCENT: 5 % (ref 0–6)
GFR SERPL CREATININE-BSD FRML MDRD: >60 ML/MIN/1.73M2
GLUCOSE BLD-MCNC: 118 MG/DL (ref 74–99)
HBA1C MFR BLD: 6.6 % (ref 4–5.6)
HCT VFR BLD CALC: 43.6 % (ref 37–54)
HDLC SERPL-MCNC: 47 MG/DL
HEMOGLOBIN: 14.8 G/DL (ref 12.5–16.5)
IMMATURE GRANULOCYTES: 0 % (ref 0–5)
LDL CHOLESTEROL: 58 MG/DL
LYMPHOCYTES ABSOLUTE: 1.9 K/UL (ref 1.5–4)
LYMPHOCYTES RELATIVE PERCENT: 36 % (ref 20–42)
MCH RBC QN AUTO: 33.8 PG (ref 26–35)
MCHC RBC AUTO-ENTMCNC: 33.9 G/DL (ref 32–34.5)
MCV RBC AUTO: 99.5 FL (ref 80–99.9)
MONOCYTES ABSOLUTE: 0.45 K/UL (ref 0.1–0.95)
MONOCYTES RELATIVE PERCENT: 9 % (ref 2–12)
NEUTROPHILS ABSOLUTE: 2.64 K/UL (ref 1.8–7.3)
NEUTROPHILS RELATIVE PERCENT: 50 % (ref 43–80)
PDW BLD-RTO: 13.2 % (ref 11.5–15)
PLATELET # BLD: 163 K/UL (ref 130–450)
PMV BLD AUTO: 10.7 FL (ref 7–12)
POTASSIUM SERPL-SCNC: 4.9 MMOL/L (ref 3.5–5)
PROSTATE SPECIFIC ANTIGEN: 1.24 NG/ML (ref 0–4)
RBC # BLD: 4.38 M/UL (ref 3.8–5.8)
SODIUM BLD-SCNC: 141 MMOL/L (ref 132–146)
TOTAL PROTEIN: 7.1 G/DL (ref 6.4–8.3)
TRIGL SERPL-MCNC: 69 MG/DL
TSH SERPL DL<=0.05 MIU/L-ACNC: 2.24 UIU/ML (ref 0.27–4.2)
VLDLC SERPL CALC-MCNC: 14 MG/DL
WBC # BLD: 5.3 K/UL (ref 4.5–11.5)

## 2024-03-12 SDOH — HEALTH STABILITY: PHYSICAL HEALTH: ON AVERAGE, HOW MANY DAYS PER WEEK DO YOU ENGAGE IN MODERATE TO STRENUOUS EXERCISE (LIKE A BRISK WALK)?: 6 DAYS

## 2024-03-12 SDOH — HEALTH STABILITY: PHYSICAL HEALTH: ON AVERAGE, HOW MANY MINUTES DO YOU ENGAGE IN EXERCISE AT THIS LEVEL?: 60 MIN

## 2024-03-12 ASSESSMENT — PATIENT HEALTH QUESTIONNAIRE - PHQ9
SUM OF ALL RESPONSES TO PHQ QUESTIONS 1-9: 0
SUM OF ALL RESPONSES TO PHQ QUESTIONS 1-9: 0
1. LITTLE INTEREST OR PLEASURE IN DOING THINGS: NOT AT ALL
SUM OF ALL RESPONSES TO PHQ QUESTIONS 1-9: 0
2. FEELING DOWN, DEPRESSED OR HOPELESS: NOT AT ALL
SUM OF ALL RESPONSES TO PHQ9 QUESTIONS 1 & 2: 0
SUM OF ALL RESPONSES TO PHQ QUESTIONS 1-9: 0

## 2024-03-12 ASSESSMENT — LIFESTYLE VARIABLES
HOW MANY STANDARD DRINKS CONTAINING ALCOHOL DO YOU HAVE ON A TYPICAL DAY: 1
HOW MANY STANDARD DRINKS CONTAINING ALCOHOL DO YOU HAVE ON A TYPICAL DAY: 1 OR 2
HOW OFTEN DO YOU HAVE SIX OR MORE DRINKS ON ONE OCCASION: 1

## 2024-03-14 ENCOUNTER — OFFICE VISIT (OUTPATIENT)
Dept: PRIMARY CARE CLINIC | Age: 84
End: 2024-03-14
Payer: MEDICARE

## 2024-03-14 VITALS
SYSTOLIC BLOOD PRESSURE: 136 MMHG | RESPIRATION RATE: 16 BRPM | DIASTOLIC BLOOD PRESSURE: 84 MMHG | TEMPERATURE: 97.2 F | HEIGHT: 71 IN | OXYGEN SATURATION: 98 % | WEIGHT: 210 LBS | BODY MASS INDEX: 29.4 KG/M2 | HEART RATE: 85 BPM

## 2024-03-14 DIAGNOSIS — Z79.01 ANTICOAGULATED: ICD-10-CM

## 2024-03-14 DIAGNOSIS — R79.89 PRERENAL AZOTEMIA: ICD-10-CM

## 2024-03-14 DIAGNOSIS — Z95.828 S/P FEMORAL-POPLITEAL BYPASS SURGERY: ICD-10-CM

## 2024-03-14 DIAGNOSIS — I71.43 INFRARENAL ABDOMINAL AORTIC ANEURYSM (AAA) WITHOUT RUPTURE (HCC): ICD-10-CM

## 2024-03-14 DIAGNOSIS — E11.51 TYPE 2 DIABETES MELLITUS WITH DIABETIC PERIPHERAL ANGIOPATHY WITHOUT GANGRENE, WITHOUT LONG-TERM CURRENT USE OF INSULIN (HCC): ICD-10-CM

## 2024-03-14 DIAGNOSIS — M17.12 PRIMARY OSTEOARTHRITIS OF LEFT KNEE: ICD-10-CM

## 2024-03-14 DIAGNOSIS — I72.4 ANEURYSM OF RIGHT POPLITEAL ARTERY (HCC): ICD-10-CM

## 2024-03-14 DIAGNOSIS — I10 ESSENTIAL HYPERTENSION: ICD-10-CM

## 2024-03-14 DIAGNOSIS — I70.209 ATHEROSCLEROTIC PERIPHERAL VASCULAR DISEASE (HCC): ICD-10-CM

## 2024-03-14 DIAGNOSIS — Z00.00 MEDICARE ANNUAL WELLNESS VISIT, SUBSEQUENT: Primary | ICD-10-CM

## 2024-03-14 DIAGNOSIS — M18.12 PRIMARY OSTEOARTHRITIS OF FIRST CARPOMETACARPAL JOINT OF LEFT HAND: ICD-10-CM

## 2024-03-14 DIAGNOSIS — I48.21 PERMANENT ATRIAL FIBRILLATION (HCC): ICD-10-CM

## 2024-03-14 DIAGNOSIS — E78.00 PURE HYPERCHOLESTEROLEMIA: ICD-10-CM

## 2024-03-14 DIAGNOSIS — I25.10 CORONARY ARTERY DISEASE INVOLVING NATIVE CORONARY ARTERY OF NATIVE HEART WITHOUT ANGINA PECTORIS: Chronic | ICD-10-CM

## 2024-03-14 PROCEDURE — 99213 OFFICE O/P EST LOW 20 MIN: CPT | Performed by: INTERNAL MEDICINE

## 2024-03-14 PROCEDURE — 3075F SYST BP GE 130 - 139MM HG: CPT | Performed by: INTERNAL MEDICINE

## 2024-03-14 PROCEDURE — G0439 PPPS, SUBSEQ VISIT: HCPCS | Performed by: INTERNAL MEDICINE

## 2024-03-14 PROCEDURE — 3079F DIAST BP 80-89 MM HG: CPT | Performed by: INTERNAL MEDICINE

## 2024-03-14 PROCEDURE — 1123F ACP DISCUSS/DSCN MKR DOCD: CPT | Performed by: INTERNAL MEDICINE

## 2024-03-14 PROCEDURE — 3044F HG A1C LEVEL LT 7.0%: CPT | Performed by: INTERNAL MEDICINE

## 2024-03-14 RX ORDER — ROSUVASTATIN CALCIUM 5 MG/1
5 TABLET, COATED ORAL DAILY
Qty: 90 TABLET | Refills: 3 | Status: SHIPPED | OUTPATIENT
Start: 2024-03-14

## 2024-03-14 RX ORDER — LOSARTAN POTASSIUM 25 MG/1
25 TABLET ORAL DAILY
Qty: 90 TABLET | Refills: 3 | Status: SHIPPED | OUTPATIENT
Start: 2024-03-14

## 2024-03-14 SDOH — ECONOMIC STABILITY: FOOD INSECURITY: WITHIN THE PAST 12 MONTHS, THE FOOD YOU BOUGHT JUST DIDN'T LAST AND YOU DIDN'T HAVE MONEY TO GET MORE.: NEVER TRUE

## 2024-03-14 SDOH — ECONOMIC STABILITY: FOOD INSECURITY: WITHIN THE PAST 12 MONTHS, YOU WORRIED THAT YOUR FOOD WOULD RUN OUT BEFORE YOU GOT MONEY TO BUY MORE.: NEVER TRUE

## 2024-03-14 SDOH — ECONOMIC STABILITY: INCOME INSECURITY: HOW HARD IS IT FOR YOU TO PAY FOR THE VERY BASICS LIKE FOOD, HOUSING, MEDICAL CARE, AND HEATING?: NOT HARD AT ALL

## 2024-03-14 NOTE — PROGRESS NOTES
Value Date    WBC 5.3 03/06/2024    HGB 14.8 03/06/2024    HCT 43.6 03/06/2024     03/06/2024    CHOL 119 03/06/2024    TRIG 69 03/06/2024    HDL 47 03/06/2024    ALT 26 03/06/2024    AST 22 03/06/2024    TSH 2.24 03/06/2024    PSA 1.24 03/06/2024    INR 1.5 10/11/2011    LABA1C 6.6 (H) 03/06/2024      Lab Results   Component Value Date     03/06/2024    K 4.9 03/06/2024     03/06/2024    CO2 23 03/06/2024    BUN 26 (H) 03/06/2024    CREATININE 1.0 03/06/2024    GLUCOSE 118 (H) 03/06/2024    CALCIUM 9.5 03/06/2024    PROT 7.1 03/06/2024    LABALBU 4.2 03/06/2024    BILITOT 0.6 03/06/2024    ALKPHOS 63 03/06/2024    AST 22 03/06/2024    ALT 26 03/06/2024    LABGLOM >60 03/06/2024    GFRAA >60 08/29/2022          Allergies   Allergen Reactions    Diltiazem Hcl      Bradycardia       Prior to Visit Medications    Medication Sig Taking? Authorizing Provider   losartan (COZAAR) 25 MG tablet Take 1 tablet by mouth daily Yes Luc Hoyt MD   rosuvastatin (CRESTOR) 5 MG tablet Take 1 tablet by mouth daily Yes Luc Hoyt MD   clotrimazole-betamethasone (LOTRISONE) 1-0.05 % cream Apply topically to the area 2 times daily. Yes Luc Hoyt MD   cycloSPORINE (RESTASIS) 0.05 % ophthalmic emulsion Place 1 drop into both eyes 2 times daily Yes ProviderRuben MD   rivaroxaban (XARELTO) 20 MG TABS tablet Take 1 tablet by mouth daily (with breakfast) Indications: pt takes with dinner Yes Luc Hoyt MD   bimatoprost (LUMIGAN) 0.01 % SOLN ophthalmic drops Place 1 drop into both eyes daily Yes Luc Hoyt MD   celecoxib (CELEBREX) 100 MG capsule Take 1 capsule by mouth as needed Yes ProviderRuben MD   aspirin 81 MG EC tablet Take 1 tablet by mouth daily Yes Provider, MD Ruben       CareTeam (Including outside providers/suppliers regularly involved in providing care):   Patient Care Team:  Luc Hoyt MD as PCP - General (Internal Medicine)  Luc Hoyt MD

## 2024-07-09 DIAGNOSIS — Z95.828 S/P FEMORAL-POPLITEAL BYPASS SURGERY: ICD-10-CM

## 2024-07-09 DIAGNOSIS — I71.43 INFRARENAL ABDOMINAL AORTIC ANEURYSM (AAA) WITHOUT RUPTURE (HCC): Primary | ICD-10-CM

## 2024-07-09 DIAGNOSIS — I73.9 PVD (PERIPHERAL VASCULAR DISEASE) (HCC): ICD-10-CM

## 2024-07-09 DIAGNOSIS — I72.4 ANEURYSM OF RIGHT POPLITEAL ARTERY (HCC): ICD-10-CM

## 2024-07-25 ENCOUNTER — HOSPITAL ENCOUNTER (OUTPATIENT)
Dept: CARDIOLOGY | Age: 84
Discharge: HOME | End: 2024-07-27
Payer: MEDICARE

## 2024-07-25 ENCOUNTER — OFFICE VISIT (OUTPATIENT)
Dept: VASCULAR SURGERY | Age: 84
End: 2024-07-25
Payer: MEDICARE

## 2024-07-25 VITALS — BODY MASS INDEX: 28.88 KG/M2 | WEIGHT: 207 LBS

## 2024-07-25 DIAGNOSIS — Z95.828 S/P FEMORAL-POPLITEAL BYPASS SURGERY: ICD-10-CM

## 2024-07-25 DIAGNOSIS — I73.9 PVD (PERIPHERAL VASCULAR DISEASE) (HCC): ICD-10-CM

## 2024-07-25 DIAGNOSIS — I72.4 ANEURYSM OF RIGHT POPLITEAL ARTERY (HCC): ICD-10-CM

## 2024-07-25 DIAGNOSIS — I71.43 INFRARENAL ABDOMINAL AORTIC ANEURYSM (AAA) WITHOUT RUPTURE (HCC): Primary | ICD-10-CM

## 2024-07-25 DIAGNOSIS — I71.43 INFRARENAL ABDOMINAL AORTIC ANEURYSM (AAA) WITHOUT RUPTURE (HCC): ICD-10-CM

## 2024-07-25 PROBLEM — R79.89 PRERENAL AZOTEMIA: Status: RESOLVED | Noted: 2023-03-16 | Resolved: 2024-07-25

## 2024-07-25 LAB
VAS AORTA DIST AP: 3.06 CM
VAS AORTA DIST TR: 3.3 CM
VAS AORTA MID AP: 2.01 CM
VAS AORTA MID TRANS: 1.94 CM
VAS LEFT ABI: 1.24
VAS LEFT ANKLE BP: 187 MMHG
VAS LEFT ARM BP: 151 MMHG
VAS LEFT CALF PRESSURE: 300 MMHG
VAS LEFT COM ILIAC AP: 2.02 CM
VAS LEFT COM ILIAC TRANS: 1.96 CM
VAS LEFT DORSALIS PEDIS BP: 178 MMHG
VAS LEFT PTA BP: 187 MMHG
VAS LEFT TBI: 0.85
VAS LEFT THIGH PRESSURE: 200 MMHG
VAS LEFT TOE PRESSURE: 129 MMHG
VAS RIGHT ABI: 1.13
VAS RIGHT ANKLE BP: 170 MMHG
VAS RIGHT ARM BP: 135 MMHG
VAS RIGHT CALF PRESSURE: 300 MMHG
VAS RIGHT COM ILIAC AP: 2.35 CM
VAS RIGHT COM ILIAC TRANS: 2.45 CM
VAS RIGHT DORSALIS PEDIS BP: 170 MMHG
VAS RIGHT POP A PROX AP DIAM: 1.84 CM
VAS RIGHT POP A PROX TR DIAM: 1.89 CM
VAS RIGHT PTA BP: 169 MMHG
VAS RIGHT TBI: 0.83
VAS RIGHT THIGH PRESSURE: 200 MMHG
VAS RIGHT TOE PRESSURE: 125 MMHG

## 2024-07-25 PROCEDURE — 93923 UPR/LXTR ART STDY 3+ LVLS: CPT

## 2024-07-25 PROCEDURE — 93926 LOWER EXTREMITY STUDY: CPT

## 2024-07-25 PROCEDURE — 99214 OFFICE O/P EST MOD 30 MIN: CPT | Performed by: SURGERY

## 2024-07-25 PROCEDURE — 93976 VASCULAR STUDY: CPT

## 2024-07-25 PROCEDURE — 1123F ACP DISCUSS/DSCN MKR DOCD: CPT | Performed by: SURGERY

## 2024-07-25 PROCEDURE — 93976 VASCULAR STUDY: CPT | Performed by: SURGERY

## 2024-07-25 PROCEDURE — 93926 LOWER EXTREMITY STUDY: CPT | Performed by: SURGERY

## 2024-07-25 NOTE — PROGRESS NOTES
motor or sensory deficits      Extremities:  Both feet are warm to touch. The color of both feet is normal.  His  Minimally prominent popliteal artery pulse noted on the right side    Pulses Right  Left    Brachial 3 3    Radial    3=normal   Femoral 2 2  2=diminished   Popliteal    1=barely palpable   Dorsalis pedis    0=absent   Posterior tibial 2-3 2-3  4=aneurysmal             Other pertinent information:1. The past medical records were reviewed.        Assessment:    1. Infrarenal abdominal aortic aneurysm (AAA) without rupture (HCC)    2. Aneurysm of right popliteal artery (HCC)    3. S/P femoral-popliteal bypass surgery              Plan:       Discussed with the patient regarding the extensive vascular workup that was done today including the lower extremity arterial Doppler study, normal ankle arm index with normal triphasic ankle Doppler tracings    The ultrasound abdominal aorta are stable, 3.1 x 3.3 cm aneurysm no change, check it every few years    The ultrasound of the right popliteal artery mild dilatation of 1.85 cm, no significant change    The patient is reassured but was instructed to come to the hospital any abdominal pain back pain any trouble or difficulty with legs              Patient was instructed to continue walking program and to call if any worsening of symptoms and to call if any focal lateralizing neurological symptoms like loss of speech, vision or loss of function of extremity.        All the questions were answered.          Indicated follow-up: Return in about 1 year (around 7/25/2025), or if symptoms worsen or fail to improve.

## 2024-08-08 DIAGNOSIS — E78.00 PURE HYPERCHOLESTEROLEMIA: ICD-10-CM

## 2024-08-08 DIAGNOSIS — I10 ESSENTIAL HYPERTENSION: ICD-10-CM

## 2024-08-08 RX ORDER — LOSARTAN POTASSIUM 25 MG/1
25 TABLET ORAL DAILY
Qty: 90 TABLET | Refills: 0 | Status: SHIPPED | OUTPATIENT
Start: 2024-08-08

## 2024-08-08 RX ORDER — ROSUVASTATIN CALCIUM 5 MG/1
5 TABLET, COATED ORAL DAILY
Qty: 90 TABLET | Refills: 0 | Status: SHIPPED | OUTPATIENT
Start: 2024-08-08

## 2024-08-21 ENCOUNTER — OFFICE VISIT (OUTPATIENT)
Dept: CARDIOLOGY CLINIC | Age: 84
End: 2024-08-21
Payer: MEDICARE

## 2024-08-21 VITALS
SYSTOLIC BLOOD PRESSURE: 120 MMHG | RESPIRATION RATE: 17 BRPM | HEART RATE: 59 BPM | DIASTOLIC BLOOD PRESSURE: 84 MMHG | BODY MASS INDEX: 29.38 KG/M2 | WEIGHT: 205.2 LBS | HEIGHT: 70 IN

## 2024-08-21 DIAGNOSIS — I10 ESSENTIAL HYPERTENSION: ICD-10-CM

## 2024-08-21 DIAGNOSIS — I73.9 PVD (PERIPHERAL VASCULAR DISEASE) (HCC): ICD-10-CM

## 2024-08-21 DIAGNOSIS — I48.21 PERMANENT ATRIAL FIBRILLATION (HCC): ICD-10-CM

## 2024-08-21 DIAGNOSIS — I25.10 CORONARY ARTERY DISEASE INVOLVING NATIVE CORONARY ARTERY OF NATIVE HEART WITHOUT ANGINA PECTORIS: Primary | ICD-10-CM

## 2024-08-21 DIAGNOSIS — Z79.01 CHRONIC ANTICOAGULATION: ICD-10-CM

## 2024-08-21 PROCEDURE — 3079F DIAST BP 80-89 MM HG: CPT | Performed by: INTERNAL MEDICINE

## 2024-08-21 PROCEDURE — 99213 OFFICE O/P EST LOW 20 MIN: CPT | Performed by: INTERNAL MEDICINE

## 2024-08-21 PROCEDURE — 93000 ELECTROCARDIOGRAM COMPLETE: CPT | Performed by: INTERNAL MEDICINE

## 2024-08-21 PROCEDURE — 1123F ACP DISCUSS/DSCN MKR DOCD: CPT | Performed by: INTERNAL MEDICINE

## 2024-08-21 PROCEDURE — 3074F SYST BP LT 130 MM HG: CPT | Performed by: INTERNAL MEDICINE

## 2024-08-21 NOTE — PROGRESS NOTES
OUTPATIENT CARDIOLOGY FOLLOW-UP    Name: Albin Ortega    Age: 83 y.o.    Primary Care Physician: Luc Hoyt MD    Date of Service: 8/21/2024    Chief Complaint: Permanent atrial fibrillation, CAD    Interim History:  Previously followed by Dr. Enrique; he established care with me in 4/2019. No new cardiac complaints since last cardiology evaluation (\"feel good\"). He denies recent chest pain, worsening SOB, palpitations, syncope, PND, or orthopnea. Rate controlled atrial fibrillation on EKG. He walks at the mall on occasion (4-5 laps) and he enjoys golfing. He is friends with Mariano Handy and Romain Erwin.    Review of Systems:   Cardiac: As per HPI  General: No fever, chills  Pulmonary: As per HPI  HEENT: No visual disturbances, difficult swallowing  GI: No nausea, vomiting  : No dysuria, hematuria  Endocrine: No thyroid disease or DM  Musculoskeletal: BREAUX x 4, no focal motor deficits  Skin: Intact, no rashes  Neuro: No headache, seizures  Psych: Currently with no depression, anxiety    Past Medical History:  Past Medical History:   Diagnosis Date    Atrial fibrillation (HCC)     Atrial fibrillation with RVR ([facility]) 9/23/2011    Basal cell cancer     resected from left leg    Current use of long term anticoagulation     Dyslipidemia     Gastric ulcer approx 2015    Hyperlipidemia     Hypertension     Myocardial infarct ([facility]) 1981    PVD (peripheral vascular disease) with claudication ([facility]) 1/9/2014    S/P femoral-popliteal bypass surgery 1/9/2014    Shoulder problem     Tachycardia     Type 2 diabetes mellitus without complication ([facility])        Past Surgical History:  Past Surgical History:   Procedure Laterality Date    ARTERIAL ANEURYSM REPAIR  11/01/2008    popliteal artery - Dr. Neil    CARDIAC CATHETERIZATION  1988, 11/2002    CARDIOVERSION  10/19/2011    [facility]    CORONARY ANGIOPLASTY  7/26/1988, 9/19/1988    CORONARY ARTERY BYPASS GRAFT  11/26/2002    ECHO COMPL W DOP COLOR FLOW  09/24/2011

## 2024-09-04 DIAGNOSIS — I10 ESSENTIAL HYPERTENSION: ICD-10-CM

## 2024-09-04 DIAGNOSIS — E78.00 PURE HYPERCHOLESTEROLEMIA: ICD-10-CM

## 2024-09-04 LAB
ALBUMIN: 4.2 G/DL (ref 3.5–5.2)
ALP BLD-CCNC: 69 U/L (ref 40–129)
ALT SERPL-CCNC: 31 U/L (ref 0–40)
ANION GAP SERPL CALCULATED.3IONS-SCNC: 16 MMOL/L (ref 7–16)
AST SERPL-CCNC: 29 U/L (ref 0–39)
BASOPHILS ABSOLUTE: 0.04 K/UL (ref 0–0.2)
BASOPHILS RELATIVE PERCENT: 1 % (ref 0–2)
BILIRUB SERPL-MCNC: 0.7 MG/DL (ref 0–1.2)
BUN BLDV-MCNC: 28 MG/DL (ref 6–23)
CALCIUM SERPL-MCNC: 9.8 MG/DL (ref 8.6–10.2)
CHLORIDE BLD-SCNC: 107 MMOL/L (ref 98–107)
CHOLESTEROL, TOTAL: 143 MG/DL
CO2: 19 MMOL/L (ref 22–29)
CREAT SERPL-MCNC: 1 MG/DL (ref 0.7–1.2)
EOSINOPHILS ABSOLUTE: 0.21 K/UL (ref 0.05–0.5)
EOSINOPHILS RELATIVE PERCENT: 4 % (ref 0–6)
GFR, ESTIMATED: 78 ML/MIN/1.73M2
GLUCOSE BLD-MCNC: 127 MG/DL (ref 74–99)
HCT VFR BLD CALC: 43 % (ref 37–54)
HDLC SERPL-MCNC: 46 MG/DL
HEMOGLOBIN: 14.6 G/DL (ref 12.5–16.5)
IMMATURE GRANULOCYTES %: 0 % (ref 0–5)
IMMATURE GRANULOCYTES ABSOLUTE: <0.03 K/UL (ref 0–0.58)
LDL CHOLESTEROL: 83 MG/DL
LYMPHOCYTES ABSOLUTE: 1.64 K/UL (ref 1.5–4)
LYMPHOCYTES RELATIVE PERCENT: 33 % (ref 20–42)
MCH RBC QN AUTO: 34.4 PG (ref 26–35)
MCHC RBC AUTO-ENTMCNC: 34 G/DL (ref 32–34.5)
MCV RBC AUTO: 101.4 FL (ref 80–99.9)
MONOCYTES ABSOLUTE: 0.39 K/UL (ref 0.1–0.95)
MONOCYTES RELATIVE PERCENT: 8 % (ref 2–12)
NEUTROPHILS ABSOLUTE: 2.69 K/UL (ref 1.8–7.3)
NEUTROPHILS RELATIVE PERCENT: 54 % (ref 43–80)
PDW BLD-RTO: 13.1 % (ref 11.5–15)
PLATELET # BLD: 158 K/UL (ref 130–450)
PMV BLD AUTO: 10.8 FL (ref 7–12)
POTASSIUM SERPL-SCNC: 4.5 MMOL/L (ref 3.5–5)
RBC # BLD: 4.24 M/UL (ref 3.8–5.8)
SODIUM BLD-SCNC: 142 MMOL/L (ref 132–146)
TOTAL PROTEIN: 7.2 G/DL (ref 6.4–8.3)
TRIGL SERPL-MCNC: 70 MG/DL
TSH SERPL DL<=0.05 MIU/L-ACNC: 3.04 UIU/ML (ref 0.27–4.2)
VLDLC SERPL CALC-MCNC: 14 MG/DL
WBC # BLD: 5 K/UL (ref 4.5–11.5)

## 2024-09-10 ENCOUNTER — OFFICE VISIT (OUTPATIENT)
Dept: PRIMARY CARE CLINIC | Age: 84
End: 2024-09-10
Payer: MEDICARE

## 2024-09-10 VITALS
BODY MASS INDEX: 29.92 KG/M2 | HEIGHT: 70 IN | RESPIRATION RATE: 16 BRPM | DIASTOLIC BLOOD PRESSURE: 68 MMHG | SYSTOLIC BLOOD PRESSURE: 110 MMHG | WEIGHT: 209 LBS | HEART RATE: 64 BPM | TEMPERATURE: 97.4 F | OXYGEN SATURATION: 97 %

## 2024-09-10 DIAGNOSIS — I10 ESSENTIAL HYPERTENSION: Primary | ICD-10-CM

## 2024-09-10 DIAGNOSIS — I25.10 CORONARY ARTERY DISEASE INVOLVING NATIVE CORONARY ARTERY OF NATIVE HEART WITHOUT ANGINA PECTORIS: Chronic | ICD-10-CM

## 2024-09-10 DIAGNOSIS — I71.43 INFRARENAL ABDOMINAL AORTIC ANEURYSM (AAA) WITHOUT RUPTURE (HCC): ICD-10-CM

## 2024-09-10 DIAGNOSIS — E11.51 TYPE 2 DIABETES MELLITUS WITH DIABETIC PERIPHERAL ANGIOPATHY WITHOUT GANGRENE, WITHOUT LONG-TERM CURRENT USE OF INSULIN (HCC): ICD-10-CM

## 2024-09-10 DIAGNOSIS — I48.21 PERMANENT ATRIAL FIBRILLATION (HCC): ICD-10-CM

## 2024-09-10 DIAGNOSIS — E78.00 PURE HYPERCHOLESTEROLEMIA: ICD-10-CM

## 2024-09-10 DIAGNOSIS — Z79.01 ANTICOAGULATED: ICD-10-CM

## 2024-09-10 PROBLEM — Z95.1 PRESENCE OF AORTOCORONARY BYPASS GRAFT: Status: ACTIVE | Noted: 2023-12-14

## 2024-09-10 PROBLEM — H02.889 MEIBOMIAN GLAND DYSFUNCTION: Status: ACTIVE | Noted: 2017-02-28

## 2024-09-10 LAB — HBA1C MFR BLD: 6.8 %

## 2024-09-10 PROCEDURE — 83036 HEMOGLOBIN GLYCOSYLATED A1C: CPT | Performed by: FAMILY MEDICINE

## 2024-09-10 PROCEDURE — 3074F SYST BP LT 130 MM HG: CPT | Performed by: FAMILY MEDICINE

## 2024-09-10 PROCEDURE — 99214 OFFICE O/P EST MOD 30 MIN: CPT | Performed by: FAMILY MEDICINE

## 2024-09-10 PROCEDURE — 1123F ACP DISCUSS/DSCN MKR DOCD: CPT | Performed by: FAMILY MEDICINE

## 2024-09-10 PROCEDURE — G2211 COMPLEX E/M VISIT ADD ON: HCPCS | Performed by: FAMILY MEDICINE

## 2024-09-10 PROCEDURE — 3078F DIAST BP <80 MM HG: CPT | Performed by: FAMILY MEDICINE

## 2024-09-10 PROCEDURE — 3044F HG A1C LEVEL LT 7.0%: CPT | Performed by: FAMILY MEDICINE

## 2024-10-04 ENCOUNTER — NURSE ONLY (OUTPATIENT)
Dept: PRIMARY CARE CLINIC | Age: 84
End: 2024-10-04
Payer: MEDICARE

## 2024-10-04 DIAGNOSIS — Z23 NEED FOR VACCINATION: Primary | ICD-10-CM

## 2024-10-04 PROCEDURE — G0008 ADMIN INFLUENZA VIRUS VAC: HCPCS | Performed by: FAMILY MEDICINE

## 2024-10-04 PROCEDURE — 90653 IIV ADJUVANT VACCINE IM: CPT | Performed by: FAMILY MEDICINE

## 2025-01-06 ENCOUNTER — OFFICE VISIT (OUTPATIENT)
Dept: PRIMARY CARE CLINIC | Age: 85
End: 2025-01-06
Payer: MEDICARE

## 2025-01-06 VITALS
HEART RATE: 65 BPM | TEMPERATURE: 97.4 F | HEIGHT: 70 IN | OXYGEN SATURATION: 92 % | DIASTOLIC BLOOD PRESSURE: 80 MMHG | BODY MASS INDEX: 30.35 KG/M2 | RESPIRATION RATE: 16 BRPM | SYSTOLIC BLOOD PRESSURE: 138 MMHG | WEIGHT: 212 LBS

## 2025-01-06 VITALS
WEIGHT: 212 LBS | BODY MASS INDEX: 30.35 KG/M2 | HEIGHT: 70 IN | HEART RATE: 65 BPM | TEMPERATURE: 97.4 F | SYSTOLIC BLOOD PRESSURE: 138 MMHG | RESPIRATION RATE: 16 BRPM | OXYGEN SATURATION: 92 % | DIASTOLIC BLOOD PRESSURE: 80 MMHG

## 2025-01-06 DIAGNOSIS — I10 ESSENTIAL HYPERTENSION: ICD-10-CM

## 2025-01-06 DIAGNOSIS — E11.51 TYPE 2 DIABETES MELLITUS WITH DIABETIC PERIPHERAL ANGIOPATHY WITHOUT GANGRENE, WITHOUT LONG-TERM CURRENT USE OF INSULIN (HCC): ICD-10-CM

## 2025-01-06 DIAGNOSIS — I70.209 ATHEROSCLEROTIC PERIPHERAL VASCULAR DISEASE (HCC): ICD-10-CM

## 2025-01-06 DIAGNOSIS — I25.10 CORONARY ARTERY DISEASE INVOLVING NATIVE CORONARY ARTERY OF NATIVE HEART WITHOUT ANGINA PECTORIS: Chronic | ICD-10-CM

## 2025-01-06 DIAGNOSIS — E78.00 PURE HYPERCHOLESTEROLEMIA: ICD-10-CM

## 2025-01-06 DIAGNOSIS — I72.4 ANEURYSM OF RIGHT POPLITEAL ARTERY (HCC): ICD-10-CM

## 2025-01-06 DIAGNOSIS — Z12.5 PROSTATE CANCER SCREENING: ICD-10-CM

## 2025-01-06 DIAGNOSIS — S86.911A MUSCLE STRAIN OF RIGHT LOWER LEG, INITIAL ENCOUNTER: Primary | ICD-10-CM

## 2025-01-06 DIAGNOSIS — Z00.00 MEDICARE ANNUAL WELLNESS VISIT, SUBSEQUENT: Primary | ICD-10-CM

## 2025-01-06 DIAGNOSIS — I48.21 PERMANENT ATRIAL FIBRILLATION (HCC): ICD-10-CM

## 2025-01-06 DIAGNOSIS — I71.43 INFRARENAL ABDOMINAL AORTIC ANEURYSM (AAA) WITHOUT RUPTURE (HCC): ICD-10-CM

## 2025-01-06 PROCEDURE — G0439 PPPS, SUBSEQ VISIT: HCPCS | Performed by: FAMILY MEDICINE

## 2025-01-06 PROCEDURE — 1123F ACP DISCUSS/DSCN MKR DOCD: CPT | Performed by: FAMILY MEDICINE

## 2025-01-06 PROCEDURE — 1160F RVW MEDS BY RX/DR IN RCRD: CPT | Performed by: FAMILY MEDICINE

## 2025-01-06 PROCEDURE — 99214 OFFICE O/P EST MOD 30 MIN: CPT | Performed by: FAMILY MEDICINE

## 2025-01-06 PROCEDURE — 3075F SYST BP GE 130 - 139MM HG: CPT | Performed by: FAMILY MEDICINE

## 2025-01-06 PROCEDURE — 1159F MED LIST DOCD IN RCRD: CPT | Performed by: FAMILY MEDICINE

## 2025-01-06 PROCEDURE — 3079F DIAST BP 80-89 MM HG: CPT | Performed by: FAMILY MEDICINE

## 2025-01-06 ASSESSMENT — PATIENT HEALTH QUESTIONNAIRE - PHQ9
2. FEELING DOWN, DEPRESSED OR HOPELESS: NOT AT ALL
SUM OF ALL RESPONSES TO PHQ9 QUESTIONS 1 & 2: 0
1. LITTLE INTEREST OR PLEASURE IN DOING THINGS: NOT AT ALL
SUM OF ALL RESPONSES TO PHQ QUESTIONS 1-9: 0

## 2025-01-06 ASSESSMENT — LIFESTYLE VARIABLES
HOW OFTEN DO YOU HAVE A DRINK CONTAINING ALCOHOL: MONTHLY OR LESS
HOW MANY STANDARD DRINKS CONTAINING ALCOHOL DO YOU HAVE ON A TYPICAL DAY: 1 OR 2

## 2025-01-06 NOTE — PATIENT INSTRUCTIONS
secondhand smoke too.     Stay at a weight that's healthy for you. Talk to your doctor if you need help losing weight.     Try to get 7 to 9 hours of sleep each night.     Limit alcohol to 2 drinks a day for men and 1 drink a day for women. Too much alcohol can cause health problems.     Manage other health problems such as diabetes, high blood pressure, and high cholesterol. If you think you may have a problem with alcohol or drug use, talk to your doctor.   Medicines    Take your medicines exactly as prescribed. Call your doctor if you think you are having a problem with your medicine.     If your doctor recommends aspirin, take the amount directed each day. Make sure you take aspirin and not another kind of pain reliever, such as acetaminophen (Tylenol).   When should you call for help?   Call 911 if you have symptoms of a heart attack. These may include:    Chest pain or pressure, or a strange feeling in the chest.     Sweating.     Shortness of breath.     Pain, pressure, or a strange feeling in the back, neck, jaw, or upper belly or in one or both shoulders or arms.     Lightheadedness or sudden weakness.     A fast or irregular heartbeat.   After you call 911, the  may tell you to chew 1 adult-strength or 2 to 4 low-dose aspirin. Wait for an ambulance. Do not try to drive yourself.  Watch closely for changes in your health, and be sure to contact your doctor if you have any problems.  Where can you learn more?  Go to https://[email or web address]/patientEd and enter F075 to learn more about \"A Healthy Heart: Care Instructions.\"  Current as of: June 24, 2023  Content Version: 14.2  © 2024 [employer].   Care instructions adapted under license by [employer]. If you have questions about a medical condition or this instruction, always ask your healthcare professional. [employer] disclaims any warranty or liability for your use of this information.      Personalized Preventive Plan

## 2025-01-06 NOTE — PROGRESS NOTES
09/04/2024    K 4.5 09/04/2024     09/04/2024    CO2 19 (L) 09/04/2024    BUN 28 (H) 09/04/2024    CREATININE 1.0 09/04/2024    GLUCOSE 127 (H) 09/04/2024    CALCIUM 9.8 09/04/2024    BILITOT 0.7 09/04/2024    ALKPHOS 69 09/04/2024    AST 29 09/04/2024    ALT 31 09/04/2024    LABGLOM 78 09/04/2024    GFRAA >60 08/29/2022     Lab Results   Component Value Date    CHOL 143 09/04/2024    TRIG 70 09/04/2024    HDL 46 09/04/2024    VLDL 14 09/04/2024     Lab Results   Component Value Date    TSH 3.04 09/04/2024     ASSESSMENT & PLAN:    Diagnoses attached to this encounter:  Muscle strain of right lower leg, initial encounter  Essential hypertension  Permanent atrial fibrillation (HCC)  Type 2 diabetes mellitus with diabetic peripheral angiopathy without gangrene, without long-term current use of insulin (HCC)  Coronary artery disease involving native coronary artery of native heart without angina pectoris  Infrarenal abdominal aortic aneurysm (AAA) without rupture (HCC)  Pure hypercholesterolemia  Aneurysm of right popliteal artery (HCC)  Atherosclerotic peripheral vascular disease (HCC)  Prostate cancer screening  -     PSA Screening; Future     Discussion: He is to avoid any excessive activity until the muscle is feeling better.  He is to notify me if he has any erythema, ecchymoses or swelling developing in the left lower leg.  He will continue all of his current meds and supplements the same as listed on his med list.  He is encouraged to try to maintain a low-cholesterol, low refined carbohydrate, heart healthy diet and stay physically active.  He is encouraged to try to stay adequately hydrated.  He will follow-up with his other physicians as per their instructions and return here as needed or in 2 months 2 months for regular follow-up visit with labs.    Return in about 10 weeks (around 3/17/2025).    30 minutes spent on visit including face to face encounter with patient, reviewing chart, labs, medications

## 2025-01-06 NOTE — PROGRESS NOTES
Medicare Annual Wellness Visit    Albin Ortega is here for Medicare AWV    Assessment & Plan   Medicare annual wellness visit, subsequent     Return for Medicare Annual Wellness Visit in 1 year.     Subjective     Patient's complete Health Risk Assessment and screening values have been reviewed and are found in Flowsheets. The following problems were reviewed today and where indicated follow up appointments were made and/or referrals ordered.    Positive Risk Factor Screenings with Interventions:             General HRA Questions:  Select all that apply: (!) New or Increased Pain  Interventions - Pain:  Patient advised to follow up in the office for further evaluation and treatment        Abnormal BMI (obese):  Body mass index is 30.42 kg/m². (!) Abnormal  Interventions:  low carbohydrate diet, exercise for at least 150 minutes/week               Objective   Vitals:    01/06/25 1250   BP: 138/80   Pulse: 65   Resp: 16   Temp: 97.4 °F (36.3 °C)   SpO2: 92%   Weight: 96.2 kg (212 lb)   Height: 1.778 m (5' 10\")      Body mass index is 30.42 kg/m².             Allergies   Allergen Reactions    Diltiazem Hcl      Bradycardia       Prior to Visit Medications    Medication Sig Taking? Authorizing Provider   losartan (COZAAR) 25 MG tablet Take 1 tablet by mouth daily Yes Bud Ro MD   rosuvastatin (CRESTOR) 5 MG tablet Take 1 tablet by mouth daily Yes Bud Ro MD   clotrimazole-betamethasone (LOTRISONE) 1-0.05 % cream Apply topically to the area 2 times daily. Yes Luc oHyt MD   cycloSPORINE (RESTASIS) 0.05 % ophthalmic emulsion Place 1 drop into both eyes 2 times daily Yes ProviderRuben MD   rivaroxaban (XARELTO) 20 MG TABS tablet Take 1 tablet by mouth daily (with breakfast) Indications: pt takes with dinner Yes Luc Hoyt MD   bimatoprost (LUMIGAN) 0.01 % SOLN ophthalmic drops Place 1 drop into both eyes daily Yes Luc Hoyt MD   celecoxib (CELEBREX) 100 MG capsule Take 1 capsule by

## 2025-03-17 DIAGNOSIS — I10 ESSENTIAL HYPERTENSION: ICD-10-CM

## 2025-03-17 DIAGNOSIS — Z12.5 PROSTATE CANCER SCREENING: ICD-10-CM

## 2025-03-17 DIAGNOSIS — E11.51 TYPE 2 DIABETES MELLITUS WITH DIABETIC PERIPHERAL ANGIOPATHY WITHOUT GANGRENE, WITHOUT LONG-TERM CURRENT USE OF INSULIN (HCC): ICD-10-CM

## 2025-03-17 DIAGNOSIS — E78.00 PURE HYPERCHOLESTEROLEMIA: ICD-10-CM

## 2025-03-17 LAB
ALBUMIN: 4.3 G/DL (ref 3.5–5.2)
ALP BLD-CCNC: 66 U/L (ref 40–129)
ALT SERPL-CCNC: 28 U/L (ref 0–40)
ANION GAP SERPL CALCULATED.3IONS-SCNC: 12 MMOL/L (ref 7–16)
AST SERPL-CCNC: 26 U/L (ref 0–39)
BASOPHILS ABSOLUTE: 0.05 K/UL (ref 0–0.2)
BASOPHILS RELATIVE PERCENT: 1 % (ref 0–2)
BILIRUB SERPL-MCNC: 0.7 MG/DL (ref 0–1.2)
BUN BLDV-MCNC: 19 MG/DL (ref 6–23)
CALCIUM SERPL-MCNC: 9.4 MG/DL (ref 8.6–10.2)
CHLORIDE BLD-SCNC: 105 MMOL/L (ref 98–107)
CHOLESTEROL, TOTAL: 121 MG/DL
CO2: 23 MMOL/L (ref 22–29)
CREAT SERPL-MCNC: 1 MG/DL (ref 0.7–1.2)
EOSINOPHILS ABSOLUTE: 0.27 K/UL (ref 0.05–0.5)
EOSINOPHILS RELATIVE PERCENT: 5 % (ref 0–6)
GFR, ESTIMATED: 71 ML/MIN/1.73M2
GLUCOSE BLD-MCNC: 118 MG/DL (ref 74–99)
HBA1C MFR BLD: 6.7 % (ref 4–5.6)
HCT VFR BLD CALC: 42.7 % (ref 37–54)
HDLC SERPL-MCNC: 48 MG/DL
HEMOGLOBIN: 14.2 G/DL (ref 12.5–16.5)
IMMATURE GRANULOCYTES %: 1 % (ref 0–5)
IMMATURE GRANULOCYTES ABSOLUTE: 0.03 K/UL (ref 0–0.58)
LDL CHOLESTEROL: 60 MG/DL
LYMPHOCYTES ABSOLUTE: 1.63 K/UL (ref 1.5–4)
LYMPHOCYTES RELATIVE PERCENT: 32 % (ref 20–42)
MCH RBC QN AUTO: 33.3 PG (ref 26–35)
MCHC RBC AUTO-ENTMCNC: 33.3 G/DL (ref 32–34.5)
MCV RBC AUTO: 100.2 FL (ref 80–99.9)
MONOCYTES ABSOLUTE: 0.39 K/UL (ref 0.1–0.95)
MONOCYTES RELATIVE PERCENT: 8 % (ref 2–12)
NEUTROPHILS ABSOLUTE: 2.81 K/UL (ref 1.8–7.3)
NEUTROPHILS RELATIVE PERCENT: 54 % (ref 43–80)
PDW BLD-RTO: 13.1 % (ref 11.5–15)
PLATELET # BLD: 177 K/UL (ref 130–450)
PMV BLD AUTO: 10.7 FL (ref 7–12)
POTASSIUM SERPL-SCNC: 4.7 MMOL/L (ref 3.5–5)
PROSTATE SPECIFIC ANTIGEN: 0.94 NG/ML (ref 0–4)
RBC # BLD: 4.26 M/UL (ref 3.8–5.8)
SODIUM BLD-SCNC: 140 MMOL/L (ref 132–146)
TOTAL PROTEIN: 7.3 G/DL (ref 6.4–8.3)
TRIGL SERPL-MCNC: 65 MG/DL
TSH SERPL DL<=0.05 MIU/L-ACNC: 2.91 UIU/ML (ref 0.27–4.2)
VLDLC SERPL CALC-MCNC: 13 MG/DL
WBC # BLD: 5.2 K/UL (ref 4.5–11.5)

## 2025-03-22 SDOH — ECONOMIC STABILITY: FOOD INSECURITY: WITHIN THE PAST 12 MONTHS, YOU WORRIED THAT YOUR FOOD WOULD RUN OUT BEFORE YOU GOT MONEY TO BUY MORE.: NEVER TRUE

## 2025-03-22 SDOH — ECONOMIC STABILITY: FOOD INSECURITY: WITHIN THE PAST 12 MONTHS, THE FOOD YOU BOUGHT JUST DIDN'T LAST AND YOU DIDN'T HAVE MONEY TO GET MORE.: NEVER TRUE

## 2025-03-22 SDOH — ECONOMIC STABILITY: INCOME INSECURITY: IN THE LAST 12 MONTHS, WAS THERE A TIME WHEN YOU WERE NOT ABLE TO PAY THE MORTGAGE OR RENT ON TIME?: NO

## 2025-03-22 SDOH — ECONOMIC STABILITY: TRANSPORTATION INSECURITY
IN THE PAST 12 MONTHS, HAS THE LACK OF TRANSPORTATION KEPT YOU FROM MEDICAL APPOINTMENTS OR FROM GETTING MEDICATIONS?: NO

## 2025-03-24 ENCOUNTER — OFFICE VISIT (OUTPATIENT)
Dept: PRIMARY CARE CLINIC | Age: 85
End: 2025-03-24
Payer: MEDICARE

## 2025-03-24 VITALS
TEMPERATURE: 97 F | BODY MASS INDEX: 29.63 KG/M2 | HEIGHT: 70 IN | DIASTOLIC BLOOD PRESSURE: 80 MMHG | HEART RATE: 61 BPM | OXYGEN SATURATION: 97 % | SYSTOLIC BLOOD PRESSURE: 130 MMHG | RESPIRATION RATE: 18 BRPM | WEIGHT: 207 LBS

## 2025-03-24 DIAGNOSIS — I10 ESSENTIAL HYPERTENSION: Primary | ICD-10-CM

## 2025-03-24 DIAGNOSIS — Z23 NEED FOR VACCINATION: ICD-10-CM

## 2025-03-24 DIAGNOSIS — I71.43 INFRARENAL ABDOMINAL AORTIC ANEURYSM (AAA) WITHOUT RUPTURE: ICD-10-CM

## 2025-03-24 DIAGNOSIS — I70.209 ATHEROSCLEROTIC PERIPHERAL VASCULAR DISEASE: ICD-10-CM

## 2025-03-24 DIAGNOSIS — E11.51 TYPE 2 DIABETES MELLITUS WITH DIABETIC PERIPHERAL ANGIOPATHY WITHOUT GANGRENE, WITHOUT LONG-TERM CURRENT USE OF INSULIN (HCC): ICD-10-CM

## 2025-03-24 DIAGNOSIS — E78.00 PURE HYPERCHOLESTEROLEMIA: ICD-10-CM

## 2025-03-24 DIAGNOSIS — I25.10 CORONARY ARTERY DISEASE INVOLVING NATIVE CORONARY ARTERY OF NATIVE HEART WITHOUT ANGINA PECTORIS: Chronic | ICD-10-CM

## 2025-03-24 DIAGNOSIS — I48.21 PERMANENT ATRIAL FIBRILLATION (HCC): ICD-10-CM

## 2025-03-24 PROCEDURE — 1160F RVW MEDS BY RX/DR IN RCRD: CPT | Performed by: FAMILY MEDICINE

## 2025-03-24 PROCEDURE — 3044F HG A1C LEVEL LT 7.0%: CPT | Performed by: FAMILY MEDICINE

## 2025-03-24 PROCEDURE — 1123F ACP DISCUSS/DSCN MKR DOCD: CPT | Performed by: FAMILY MEDICINE

## 2025-03-24 PROCEDURE — 3079F DIAST BP 80-89 MM HG: CPT | Performed by: FAMILY MEDICINE

## 2025-03-24 PROCEDURE — 3075F SYST BP GE 130 - 139MM HG: CPT | Performed by: FAMILY MEDICINE

## 2025-03-24 PROCEDURE — 1159F MED LIST DOCD IN RCRD: CPT | Performed by: FAMILY MEDICINE

## 2025-03-24 PROCEDURE — 90471 IMMUNIZATION ADMIN: CPT | Performed by: FAMILY MEDICINE

## 2025-03-24 PROCEDURE — 90715 TDAP VACCINE 7 YRS/> IM: CPT | Performed by: FAMILY MEDICINE

## 2025-03-24 PROCEDURE — 99214 OFFICE O/P EST MOD 30 MIN: CPT | Performed by: FAMILY MEDICINE

## 2025-03-24 RX ORDER — ROSUVASTATIN CALCIUM 5 MG/1
5 TABLET, COATED ORAL
Qty: 90 TABLET | Refills: 0 | Status: SHIPPED | OUTPATIENT
Start: 2025-03-24

## 2025-05-09 DIAGNOSIS — E78.00 PURE HYPERCHOLESTEROLEMIA: ICD-10-CM

## 2025-05-09 RX ORDER — ROSUVASTATIN CALCIUM 5 MG/1
5 TABLET, COATED ORAL
Qty: 36 TABLET | Refills: 0 | Status: SHIPPED | OUTPATIENT
Start: 2025-05-09

## 2025-05-09 NOTE — TELEPHONE ENCOUNTER
Name of Medication(s) Requested:  Requested Prescriptions     Pending Prescriptions Disp Refills    rosuvastatin (CRESTOR) 5 MG tablet 36 tablet 0     Sig: Take 1 tablet by mouth three times a week       Medication is on current medication list Yes    Dosage and directions were verified? Yes    Quantity verified: 90 day supply     Pharmacy Verified?  Yes    Last Appointment:  3/24/2025    Future appts:  Future Appointments   Date Time Provider Department Center   7/31/2025  7:45 AM SEY YNG VAS US 1 [facility]   7/31/2025  8:00 AM SEY YNG VAS US 1 [facility]   7/31/2025  8:30 AM SEY YNG [facility]AS US 1 [facility]   7/31/2025  9:00 AM Sean Neil MD [facility]   8/27/2025  7:30 AM Waqar Giron MD [facility]        (If no appt send self scheduling link. .[email or web address])  Scheduling request sent?     [] Yes  [] No    Does patient need updated?  [] Yes  [] No

## 2025-06-03 ENCOUNTER — OFFICE VISIT (OUTPATIENT)
Dept: PRIMARY CARE CLINIC | Age: 85
End: 2025-06-03
Payer: MEDICARE

## 2025-06-03 ENCOUNTER — TELEPHONE (OUTPATIENT)
Dept: CARDIOLOGY CLINIC | Age: 85
End: 2025-06-03

## 2025-06-03 VITALS
RESPIRATION RATE: 16 BRPM | SYSTOLIC BLOOD PRESSURE: 142 MMHG | WEIGHT: 203 LBS | DIASTOLIC BLOOD PRESSURE: 96 MMHG | HEART RATE: 92 BPM | TEMPERATURE: 97.8 F | HEIGHT: 70 IN | BODY MASS INDEX: 29.06 KG/M2 | OXYGEN SATURATION: 97 %

## 2025-06-03 DIAGNOSIS — E78.00 PURE HYPERCHOLESTEROLEMIA: ICD-10-CM

## 2025-06-03 DIAGNOSIS — I10 ESSENTIAL HYPERTENSION: Primary | ICD-10-CM

## 2025-06-03 DIAGNOSIS — I48.21 PERMANENT ATRIAL FIBRILLATION (HCC): ICD-10-CM

## 2025-06-03 DIAGNOSIS — I25.10 CORONARY ARTERY DISEASE INVOLVING NATIVE CORONARY ARTERY OF NATIVE HEART WITHOUT ANGINA PECTORIS: Chronic | ICD-10-CM

## 2025-06-03 DIAGNOSIS — E11.51 TYPE 2 DIABETES MELLITUS WITH DIABETIC PERIPHERAL ANGIOPATHY WITHOUT GANGRENE, WITHOUT LONG-TERM CURRENT USE OF INSULIN (HCC): ICD-10-CM

## 2025-06-03 PROCEDURE — 3077F SYST BP >= 140 MM HG: CPT | Performed by: FAMILY MEDICINE

## 2025-06-03 PROCEDURE — 1159F MED LIST DOCD IN RCRD: CPT | Performed by: FAMILY MEDICINE

## 2025-06-03 PROCEDURE — 3044F HG A1C LEVEL LT 7.0%: CPT | Performed by: FAMILY MEDICINE

## 2025-06-03 PROCEDURE — 1123F ACP DISCUSS/DSCN MKR DOCD: CPT | Performed by: FAMILY MEDICINE

## 2025-06-03 PROCEDURE — 3080F DIAST BP >= 90 MM HG: CPT | Performed by: FAMILY MEDICINE

## 2025-06-03 PROCEDURE — G2211 COMPLEX E/M VISIT ADD ON: HCPCS | Performed by: FAMILY MEDICINE

## 2025-06-03 PROCEDURE — 99214 OFFICE O/P EST MOD 30 MIN: CPT | Performed by: FAMILY MEDICINE

## 2025-06-03 RX ORDER — LOSARTAN POTASSIUM 25 MG/1
50 TABLET ORAL DAILY
Qty: 90 TABLET | Refills: 0
Start: 2025-06-03

## 2025-06-03 NOTE — PROGRESS NOTES
6/3/2025     Chief Complaint   Patient presents with    Hypertension    Gastroesophageal Reflux      HPI: Patient comes in today with concerns of elevated blood pressure.  He has a colonoscopy scheduled next week and is afraid his blood pressure might be too high.  He he has been checking his blood pressure at home and found that it is running close to 140/90.  His BP usually runs 120/70.  He has not had any chest pain, palpitations, heart racing, shortness of breath, headache, dizziness or lower extremity edema. He currently feels well, although he admits that he feels anxious over his BP situation. He follows up with his cardiologist for his history of coronary artery disease and chronic atrial fibrillation. He remains on long-term anticoagulation with Xarelto and aspirin; there have been no reported bleeding issues. He follows up with his vascular surgeon for surveillance of his atherosclerotic peripheral vascular disease and abdominal aortic aneurysm, which have been stable. He tries to follow a heart healthy diet and stays physically active, playing golf 2-3 times a week. Also he follows up with his dermatologist for his history of recurring skin malignancies.     Review of Systems: as per HPI.    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 Neil    CARDIAC CATHETERIZATION  1988, 11/2002    CARDIOVERSION

## 2025-06-03 NOTE — TELEPHONE ENCOUNTER
Okay to proceed. Okay to hold Xarelto for 48 hours prior if needed.     Beverley Apodaca D.O.  Cardiologist  Cardiology, [facility]

## 2025-07-11 DIAGNOSIS — Z95.828 S/P FEMORAL-POPLITEAL BYPASS SURGERY: Primary | ICD-10-CM

## 2025-07-11 DIAGNOSIS — I72.4 ANEURYSM OF RIGHT POPLITEAL ARTERY: ICD-10-CM

## 2025-07-11 DIAGNOSIS — I73.9 PERIPHERAL VASCULAR DISEASE: ICD-10-CM

## 2025-07-11 DIAGNOSIS — I71.43 INFRARENAL ABDOMINAL AORTIC ANEURYSM (AAA) WITHOUT RUPTURE: ICD-10-CM

## 2025-07-19 DIAGNOSIS — E78.00 PURE HYPERCHOLESTEROLEMIA: ICD-10-CM

## 2025-07-21 RX ORDER — ROSUVASTATIN CALCIUM 5 MG/1
TABLET, COATED ORAL
Qty: 36 TABLET | Refills: 3 | Status: SHIPPED | OUTPATIENT
Start: 2025-07-21

## 2025-07-21 NOTE — TELEPHONE ENCOUNTER
Name of Medication(s) Requested:  Requested Prescriptions     Pending Prescriptions Disp Refills    rosuvastatin (CRESTOR) 5 MG tablet [Pharmacy Med Name: ROSUVASTATIN CALCIUM 5MG TABS] 36 tablet 3     Sig: TAKE ONE TABLET BY MOUTH THREE TIMES WEEKLY       Medication is on current medication list Yes    Dosage and directions were verified? Yes    Quantity verified: 30 day supply     Pharmacy Verified?  Yes    Last Appointment:  Visit date not found    Future appts:  Future Appointments   Date Time Provider Department Center   7/31/2025  7:45 AM SEY YNG VAS US 1 [facility]   7/31/2025  8:00 AM SEY YNG VAS US 1 [facility]   7/31/2025  8:30 AM SEY YNG VAS US 1 [facility]   7/31/2025  9:00 AM Sean Neil MD VASC/MED [facility]   8/27/2025  7:30 AM Waqar Giron MD [facility]   9/23/2025  8:00 AM Bud Ro MD TRAIL PC [facility]        (If no appt send self scheduling link. .REFILLAPPT)  Scheduling request sent?     [] Yes  [] No    Does patient need updated?  [] Yes  [] No

## 2025-08-08 ENCOUNTER — HOSPITAL ENCOUNTER (OUTPATIENT)
Dept: CARDIOLOGY | Age: 85
Discharge: HOME | End: 2025-08-10
Attending: SURGERY
Payer: MEDICARE

## 2025-08-08 DIAGNOSIS — I72.4 ANEURYSM OF RIGHT POPLITEAL ARTERY: ICD-10-CM

## 2025-08-08 DIAGNOSIS — I73.9 PERIPHERAL VASCULAR DISEASE: ICD-10-CM

## 2025-08-08 DIAGNOSIS — I71.43 INFRARENAL ABDOMINAL AORTIC ANEURYSM (AAA) WITHOUT RUPTURE: ICD-10-CM

## 2025-08-08 DIAGNOSIS — Z95.828 S/P FEMORAL-POPLITEAL BYPASS SURGERY: ICD-10-CM

## 2025-08-08 LAB
VAS AORTA DIST AP: 3.16 CM
VAS AORTA DIST TR: 3.26 CM
VAS AORTA MID AP: 2.03 CM
VAS AORTA MID TRANS: 2.1 CM
VAS AORTA PROX AP: 2.15 CM
VAS AORTA PROX TR: 2.3 CM
VAS LEFT ANKLE BP: 300 MMHG
VAS LEFT ARM BP: 165 MMHG
VAS LEFT CALF PRESSURE: 300 MMHG
VAS LEFT COM ILIAC AP: 2.02 CM
VAS LEFT COM ILIAC TRANS: 2.05 CM
VAS LEFT DORSALIS PEDIS BP: 184 MMHG
VAS LEFT PTA BP: 300 MMHG
VAS LEFT TBI: 1.06
VAS LEFT THIGH PRESSURE: 200 MMHG
VAS LEFT TOE PRESSURE: 175 MMHG
VAS RIGHT ABI: 1.15
VAS RIGHT ANKLE BP: 190 MMHG
VAS RIGHT ARM BP: 159 MMHG
VAS RIGHT CALF PRESSURE: 300 MMHG
VAS RIGHT COM ILIAC AP: 2.36 CM
VAS RIGHT COM ILIAC TRANS: 2.43 CM
VAS RIGHT DORSALIS PEDIS BP: 190 MMHG
VAS RIGHT POP A PROX AP DIAM: 1.76 CM
VAS RIGHT POP A PROX TR DIAM: 1.76 CM
VAS RIGHT PTA BP: 184 MMHG
VAS RIGHT TBI: 1.04
VAS RIGHT THIGH PRESSURE: 200 MMHG
VAS RIGHT TOE PRESSURE: 172 MMHG

## 2025-08-08 PROCEDURE — 93923 UPR/LXTR ART STDY 3+ LVLS: CPT | Performed by: SURGERY

## 2025-08-08 PROCEDURE — 93926 LOWER EXTREMITY STUDY: CPT | Performed by: SURGERY

## 2025-08-08 PROCEDURE — 93923 UPR/LXTR ART STDY 3+ LVLS: CPT

## 2025-08-08 PROCEDURE — 93976 VASCULAR STUDY: CPT

## 2025-08-08 PROCEDURE — 93926 LOWER EXTREMITY STUDY: CPT

## 2025-08-08 PROCEDURE — 93976 VASCULAR STUDY: CPT | Performed by: SURGERY

## 2025-08-14 ENCOUNTER — OFFICE VISIT (OUTPATIENT)
Dept: VASCULAR SURGERY | Age: 85
End: 2025-08-14
Payer: MEDICARE

## 2025-08-14 VITALS — WEIGHT: 200 LBS | BODY MASS INDEX: 28.38 KG/M2

## 2025-08-14 DIAGNOSIS — Z95.828 S/P FEMORAL-POPLITEAL BYPASS SURGERY: ICD-10-CM

## 2025-08-14 DIAGNOSIS — I71.43 INFRARENAL ABDOMINAL AORTIC ANEURYSM (AAA) WITHOUT RUPTURE: ICD-10-CM

## 2025-08-14 DIAGNOSIS — I72.4 ANEURYSM OF RIGHT POPLITEAL ARTERY: Primary | ICD-10-CM

## 2025-08-14 PROCEDURE — 1160F RVW MEDS BY RX/DR IN RCRD: CPT | Performed by: SURGERY

## 2025-08-14 PROCEDURE — 1123F ACP DISCUSS/DSCN MKR DOCD: CPT | Performed by: SURGERY

## 2025-08-14 PROCEDURE — 1159F MED LIST DOCD IN RCRD: CPT | Performed by: SURGERY

## 2025-08-14 PROCEDURE — 99214 OFFICE O/P EST MOD 30 MIN: CPT | Performed by: SURGERY

## 2025-08-27 ENCOUNTER — OFFICE VISIT (OUTPATIENT)
Dept: CARDIOLOGY CLINIC | Age: 85
End: 2025-08-27
Payer: MEDICARE

## 2025-08-27 VITALS
SYSTOLIC BLOOD PRESSURE: 130 MMHG | OXYGEN SATURATION: 100 % | WEIGHT: 201.8 LBS | HEART RATE: 58 BPM | RESPIRATION RATE: 18 BRPM | DIASTOLIC BLOOD PRESSURE: 62 MMHG | BODY MASS INDEX: 28.89 KG/M2 | TEMPERATURE: 96.8 F | HEIGHT: 70 IN

## 2025-08-27 DIAGNOSIS — I25.10 CORONARY ARTERY DISEASE INVOLVING NATIVE CORONARY ARTERY OF NATIVE HEART WITHOUT ANGINA PECTORIS: ICD-10-CM

## 2025-08-27 DIAGNOSIS — I10 ESSENTIAL HYPERTENSION: ICD-10-CM

## 2025-08-27 DIAGNOSIS — I73.9 PERIPHERAL VASCULAR DISEASE: ICD-10-CM

## 2025-08-27 DIAGNOSIS — I48.21 PERMANENT ATRIAL FIBRILLATION (HCC): Primary | ICD-10-CM

## 2025-08-27 DIAGNOSIS — Z79.01 CHRONIC ANTICOAGULATION: ICD-10-CM

## 2025-08-27 PROCEDURE — 99214 OFFICE O/P EST MOD 30 MIN: CPT | Performed by: INTERNAL MEDICINE

## 2025-08-27 PROCEDURE — 1123F ACP DISCUSS/DSCN MKR DOCD: CPT | Performed by: INTERNAL MEDICINE

## 2025-08-27 PROCEDURE — G2211 COMPLEX E/M VISIT ADD ON: HCPCS | Performed by: INTERNAL MEDICINE

## 2025-08-27 PROCEDURE — 93000 ELECTROCARDIOGRAM COMPLETE: CPT | Performed by: INTERNAL MEDICINE

## 2025-08-27 PROCEDURE — 1159F MED LIST DOCD IN RCRD: CPT | Performed by: INTERNAL MEDICINE
